# Patient Record
Sex: MALE | Race: WHITE | NOT HISPANIC OR LATINO | URBAN - NONMETROPOLITAN AREA
[De-identification: names, ages, dates, MRNs, and addresses within clinical notes are randomized per-mention and may not be internally consistent; named-entity substitution may affect disease eponyms.]

---

## 2017-01-16 ENCOUNTER — HISTORY (OUTPATIENT)
Dept: PEDIATRICS | Facility: OTHER | Age: 82
End: 2017-01-16

## 2017-01-16 ENCOUNTER — OFFICE VISIT - GICH (OUTPATIENT)
Dept: PEDIATRICS | Facility: OTHER | Age: 82
End: 2017-01-16

## 2017-01-16 ENCOUNTER — ANTICOAGULATION - GICH (OUTPATIENT)
Dept: INTERNAL MEDICINE | Facility: OTHER | Age: 82
End: 2017-01-16

## 2017-01-16 DIAGNOSIS — Z79.01 LONG TERM CURRENT USE OF ANTICOAGULANT: ICD-10-CM

## 2017-01-16 DIAGNOSIS — I48.92 ATRIAL FLUTTER (H): ICD-10-CM

## 2017-01-16 DIAGNOSIS — K57.30 DIVERTICULOSIS OF LARGE INTESTINE WITHOUT PERFORATION OR ABSCESS WITHOUT BLEEDING: ICD-10-CM

## 2017-01-16 DIAGNOSIS — K59.00 CONSTIPATION: ICD-10-CM

## 2017-01-16 DIAGNOSIS — G70.00 MYASTHENIA GRAVIS WITHOUT (ACUTE) EXACERBATION (H): ICD-10-CM

## 2017-01-16 LAB — INR - HISTORICAL: 4.2

## 2017-01-23 ENCOUNTER — ANTICOAGULATION - GICH (OUTPATIENT)
Dept: INTERNAL MEDICINE | Facility: OTHER | Age: 82
End: 2017-01-23

## 2017-01-23 DIAGNOSIS — I48.92 ATRIAL FLUTTER (H): ICD-10-CM

## 2017-01-23 DIAGNOSIS — Z79.01 LONG TERM CURRENT USE OF ANTICOAGULANT: ICD-10-CM

## 2017-01-23 LAB — INR - HISTORICAL: 3.4

## 2017-01-26 ENCOUNTER — AMBULATORY - GICH (OUTPATIENT)
Dept: SCHEDULING | Facility: OTHER | Age: 82
End: 2017-01-26

## 2017-01-27 ENCOUNTER — OFFICE VISIT - GICH (OUTPATIENT)
Dept: PEDIATRICS | Facility: OTHER | Age: 82
End: 2017-01-27

## 2017-01-27 ENCOUNTER — HISTORY (OUTPATIENT)
Dept: PEDIATRICS | Facility: OTHER | Age: 82
End: 2017-01-27

## 2017-01-27 DIAGNOSIS — R68.83 CHILLS (WITHOUT FEVER): ICD-10-CM

## 2017-01-27 DIAGNOSIS — R25.1 TREMOR: ICD-10-CM

## 2017-01-27 DIAGNOSIS — G70.00 MYASTHENIA GRAVIS WITHOUT (ACUTE) EXACERBATION (H): ICD-10-CM

## 2017-02-01 ENCOUNTER — HOSPITAL ENCOUNTER (OUTPATIENT)
Dept: PHYSICAL THERAPY | Facility: OTHER | Age: 82
Setting detail: THERAPIES SERIES
End: 2017-02-01
Attending: INTERNAL MEDICINE

## 2017-02-01 DIAGNOSIS — G70.00 MYASTHENIA GRAVIS WITHOUT (ACUTE) EXACERBATION (H): ICD-10-CM

## 2017-02-02 ENCOUNTER — COMMUNICATION - GICH (OUTPATIENT)
Dept: PEDIATRICS | Facility: OTHER | Age: 82
End: 2017-02-02

## 2017-02-06 ENCOUNTER — ANTICOAGULATION - GICH (OUTPATIENT)
Dept: INTERNAL MEDICINE | Facility: OTHER | Age: 82
End: 2017-02-06

## 2017-02-06 DIAGNOSIS — Z79.01 LONG TERM CURRENT USE OF ANTICOAGULANT: ICD-10-CM

## 2017-02-06 DIAGNOSIS — I48.92 ATRIAL FLUTTER (H): ICD-10-CM

## 2017-02-06 LAB — INR - HISTORICAL: 1.4

## 2017-03-01 ENCOUNTER — HISTORY (OUTPATIENT)
Dept: PEDIATRICS | Facility: OTHER | Age: 82
End: 2017-03-01

## 2017-03-01 ENCOUNTER — COMMUNICATION - GICH (OUTPATIENT)
Dept: INTERNAL MEDICINE | Facility: OTHER | Age: 82
End: 2017-03-01

## 2017-03-01 ENCOUNTER — OFFICE VISIT - GICH (OUTPATIENT)
Dept: PEDIATRICS | Facility: OTHER | Age: 82
End: 2017-03-01

## 2017-03-01 DIAGNOSIS — R25.3 FASCICULATION: ICD-10-CM

## 2017-03-01 DIAGNOSIS — G70.00 MYASTHENIA GRAVIS WITHOUT (ACUTE) EXACERBATION (H): ICD-10-CM

## 2017-03-06 ENCOUNTER — ANTICOAGULATION - GICH (OUTPATIENT)
Dept: INTERNAL MEDICINE | Facility: OTHER | Age: 82
End: 2017-03-06

## 2017-03-06 DIAGNOSIS — I48.92 ATRIAL FLUTTER (H): ICD-10-CM

## 2017-03-06 DIAGNOSIS — Z79.01 LONG TERM CURRENT USE OF ANTICOAGULANT: ICD-10-CM

## 2017-03-06 LAB — INR - HISTORICAL: 1.5

## 2017-03-20 ENCOUNTER — ANTICOAGULATION - GICH (OUTPATIENT)
Dept: INTERNAL MEDICINE | Facility: OTHER | Age: 82
End: 2017-03-20

## 2017-03-20 DIAGNOSIS — Z79.01 LONG TERM CURRENT USE OF ANTICOAGULANT: ICD-10-CM

## 2017-03-20 DIAGNOSIS — I48.92 ATRIAL FLUTTER (H): ICD-10-CM

## 2017-03-20 LAB — INR - HISTORICAL: 1.8

## 2017-04-03 ENCOUNTER — ANTICOAGULATION - GICH (OUTPATIENT)
Dept: INTERNAL MEDICINE | Facility: OTHER | Age: 82
End: 2017-04-03

## 2017-04-03 DIAGNOSIS — Z79.01 LONG TERM CURRENT USE OF ANTICOAGULANT: ICD-10-CM

## 2017-04-03 DIAGNOSIS — I48.92 ATRIAL FLUTTER (H): ICD-10-CM

## 2017-04-03 LAB — INR - HISTORICAL: 1.5

## 2017-04-17 ENCOUNTER — ANTICOAGULATION - GICH (OUTPATIENT)
Dept: INTERNAL MEDICINE | Facility: OTHER | Age: 82
End: 2017-04-17

## 2017-04-17 DIAGNOSIS — I48.92 ATRIAL FLUTTER (H): ICD-10-CM

## 2017-04-17 DIAGNOSIS — Z79.01 LONG TERM CURRENT USE OF ANTICOAGULANT: ICD-10-CM

## 2017-04-17 LAB — INR - HISTORICAL: 2.9

## 2017-04-20 ENCOUNTER — OFFICE VISIT - GICH (OUTPATIENT)
Dept: PEDIATRICS | Facility: OTHER | Age: 82
End: 2017-04-20

## 2017-04-20 ENCOUNTER — HISTORY (OUTPATIENT)
Dept: PEDIATRICS | Facility: OTHER | Age: 82
End: 2017-04-20

## 2017-04-20 DIAGNOSIS — G70.00 MYASTHENIA GRAVIS WITHOUT (ACUTE) EXACERBATION (H): ICD-10-CM

## 2017-05-08 ENCOUNTER — ANTICOAGULATION - GICH (OUTPATIENT)
Dept: INTERNAL MEDICINE | Facility: OTHER | Age: 82
End: 2017-05-08

## 2017-05-08 DIAGNOSIS — Z79.01 LONG TERM CURRENT USE OF ANTICOAGULANT: ICD-10-CM

## 2017-05-08 DIAGNOSIS — I48.92 ATRIAL FLUTTER (H): ICD-10-CM

## 2017-05-08 LAB — INR - HISTORICAL: 4.6

## 2017-05-15 ENCOUNTER — ANTICOAGULATION - GICH (OUTPATIENT)
Dept: INTERNAL MEDICINE | Facility: OTHER | Age: 82
End: 2017-05-15

## 2017-05-15 DIAGNOSIS — Z79.01 LONG TERM CURRENT USE OF ANTICOAGULANT: ICD-10-CM

## 2017-05-15 DIAGNOSIS — I48.92 ATRIAL FLUTTER (H): ICD-10-CM

## 2017-05-15 LAB — INR - HISTORICAL: 3.7

## 2017-05-25 ENCOUNTER — ANTICOAGULATION - GICH (OUTPATIENT)
Dept: INTERNAL MEDICINE | Facility: OTHER | Age: 82
End: 2017-05-25

## 2017-05-25 DIAGNOSIS — Z79.01 LONG TERM CURRENT USE OF ANTICOAGULANT: ICD-10-CM

## 2017-05-25 DIAGNOSIS — I48.92 ATRIAL FLUTTER (H): ICD-10-CM

## 2017-05-25 LAB — INR - HISTORICAL: 2.6

## 2017-06-15 ENCOUNTER — ANTICOAGULATION - GICH (OUTPATIENT)
Dept: INTERNAL MEDICINE | Facility: OTHER | Age: 82
End: 2017-06-15

## 2017-06-15 DIAGNOSIS — I48.92 ATRIAL FLUTTER (H): ICD-10-CM

## 2017-06-15 DIAGNOSIS — Z79.01 LONG TERM CURRENT USE OF ANTICOAGULANT: ICD-10-CM

## 2017-06-15 LAB — INR - HISTORICAL: 2.2

## 2017-07-20 ENCOUNTER — ANTICOAGULATION - GICH (OUTPATIENT)
Dept: INTERNAL MEDICINE | Facility: OTHER | Age: 82
End: 2017-07-20

## 2017-07-20 DIAGNOSIS — I48.92 ATRIAL FLUTTER (H): ICD-10-CM

## 2017-07-20 DIAGNOSIS — Z79.01 LONG TERM CURRENT USE OF ANTICOAGULANT: ICD-10-CM

## 2017-07-20 LAB — INR - HISTORICAL: 1.8

## 2017-07-24 ENCOUNTER — COMMUNICATION - GICH (OUTPATIENT)
Dept: PEDIATRICS | Facility: OTHER | Age: 82
End: 2017-07-24

## 2017-07-24 DIAGNOSIS — I48.92 ATRIAL FLUTTER (H): ICD-10-CM

## 2017-07-24 DIAGNOSIS — Z79.01 LONG TERM CURRENT USE OF ANTICOAGULANT: ICD-10-CM

## 2017-07-26 ENCOUNTER — OFFICE VISIT - GICH (OUTPATIENT)
Dept: PEDIATRICS | Facility: OTHER | Age: 82
End: 2017-07-26

## 2017-07-26 DIAGNOSIS — Z95.0 PRESENCE OF CARDIAC PACEMAKER: ICD-10-CM

## 2017-07-26 DIAGNOSIS — G70.00 MYASTHENIA GRAVIS WITHOUT (ACUTE) EXACERBATION (H): ICD-10-CM

## 2017-07-26 DIAGNOSIS — I48.20 CHRONIC ATRIAL FIBRILLATION (H): ICD-10-CM

## 2017-08-03 ENCOUNTER — ANTICOAGULATION - GICH (OUTPATIENT)
Dept: INTERNAL MEDICINE | Facility: OTHER | Age: 82
End: 2017-08-03

## 2017-08-03 DIAGNOSIS — I48.92 ATRIAL FLUTTER (H): ICD-10-CM

## 2017-08-03 DIAGNOSIS — Z79.01 LONG TERM CURRENT USE OF ANTICOAGULANT: ICD-10-CM

## 2017-08-03 LAB — INR - HISTORICAL: 2.5

## 2017-08-04 ENCOUNTER — OFFICE VISIT - GICH (OUTPATIENT)
Dept: FAMILY MEDICINE | Facility: OTHER | Age: 82
End: 2017-08-04

## 2017-08-04 ENCOUNTER — HOSPITAL ENCOUNTER (OUTPATIENT)
Dept: RADIOLOGY | Facility: OTHER | Age: 82
End: 2017-08-04
Attending: FAMILY MEDICINE

## 2017-08-04 ENCOUNTER — COMMUNICATION - GICH (OUTPATIENT)
Dept: INTERNAL MEDICINE | Facility: OTHER | Age: 82
End: 2017-08-04

## 2017-08-04 ENCOUNTER — HISTORY (OUTPATIENT)
Dept: FAMILY MEDICINE | Facility: OTHER | Age: 82
End: 2017-08-04

## 2017-08-04 DIAGNOSIS — R50.9 FEVER: ICD-10-CM

## 2017-08-04 DIAGNOSIS — R31.9 HEMATURIA: ICD-10-CM

## 2017-08-04 DIAGNOSIS — Z79.01 LONG TERM CURRENT USE OF ANTICOAGULANT: ICD-10-CM

## 2017-08-04 DIAGNOSIS — K57.92 DIVERTICULITIS OF INTESTINE WITHOUT PERFORATION OR ABSCESS WITHOUT BLEEDING: ICD-10-CM

## 2017-08-04 LAB
A/G RATIO - HISTORICAL: 1.2 (ref 1–2)
ABSOLUTE BASOPHILS - HISTORICAL: 0 THOU/CU MM
ABSOLUTE EOSINOPHILS - HISTORICAL: 0 THOU/CU MM
ABSOLUTE IMMATURE GRANULOCYTES(METAS,MYELOS,PROS) - HISTORICAL: 0 THOU/CU MM
ABSOLUTE LYMPHOCYTES - HISTORICAL: 2.1 THOU/CU MM (ref 0.9–2.9)
ABSOLUTE MONOCYTES - HISTORICAL: 1 THOU/CU MM
ABSOLUTE NEUTROPHILS - HISTORICAL: 6.5 THOU/CU MM (ref 1.7–7)
ALBUMIN SERPL-MCNC: 4.3 G/DL (ref 3.5–5.7)
ALP SERPL-CCNC: 66 IU/L (ref 34–104)
ALT (SGPT) - HISTORICAL: 10 IU/L (ref 7–52)
ANION GAP - HISTORICAL: 9 (ref 5–18)
AST SERPL-CCNC: 22 IU/L (ref 13–39)
BACTERIA URINE: ABNORMAL BACTERIA/HPF
BASOPHILS # BLD AUTO: 0.1 %
BILIRUB SERPL-MCNC: 1.5 MG/DL (ref 0.3–1)
BILIRUB UR QL: NEGATIVE
BUN SERPL-MCNC: 21 MG/DL (ref 7–25)
BUN/CREAT RATIO - HISTORICAL: 22
CALCIUM SERPL-MCNC: 9.7 MG/DL (ref 8.6–10.3)
CHLORIDE SERPLBLD-SCNC: 102 MMOL/L (ref 98–107)
CLARITY, URINE: CLEAR CLARITY
CO2 SERPL-SCNC: 25 MMOL/L (ref 21–31)
COLOR UR: YELLOW COLOR
CREAT SERPL-MCNC: 0.97 MG/DL (ref 0.7–1.3)
EOSINOPHIL NFR BLD AUTO: 0.3 %
EPITHELIAL CELLS: ABNORMAL EPI/HPF
ERYTHROCYTE [DISTWIDTH] IN BLOOD BY AUTOMATED COUNT: 14.6 % (ref 11.5–15.5)
GFR IF NOT AFRICAN AMERICAN - HISTORICAL: >60 ML/MIN/1.73M2
GLOBULIN - HISTORICAL: 3.6 G/DL (ref 2–3.7)
GLUCOSE SERPL-MCNC: 105 MG/DL (ref 70–105)
GLUCOSE URINE: NEGATIVE MG/DL
HCT VFR BLD AUTO: 38 % (ref 37–53)
HEMOGLOBIN: 12 G/DL (ref 13.5–17.5)
IMMATURE GRANULOCYTES(METAS,MYELOS,PROS) - HISTORICAL: 0.2 %
INR - HISTORICAL: 2.5
KETONES UR QL: NEGATIVE MG/DL
LACTATE SERPL-MCNC: 2.2 MMOL/L (ref 0.5–2.2)
LEUKOCYTE ESTERASE URINE: NEGATIVE
LYMPHOCYTES NFR BLD AUTO: 21.7 % (ref 20–44)
MCH RBC QN AUTO: 28 PG (ref 26–34)
MCHC RBC AUTO-ENTMCNC: 31.6 G/DL (ref 32–36)
MCV RBC AUTO: 89 FL (ref 80–100)
MONOCYTES NFR BLD AUTO: 10 %
NEUTROPHILS NFR BLD AUTO: 67.7 % (ref 42–72)
NITRITE UR QL STRIP: NEGATIVE
OCCULT BLOOD,URINE - HISTORICAL: ABNORMAL
OTHER: ABNORMAL
PH UR: 7.5 [PH]
PLATELET # BLD AUTO: 177 THOU/CU MM (ref 140–440)
PMV BLD: 11.8 FL (ref 6.5–11)
POTASSIUM SERPL-SCNC: 4.8 MMOL/L (ref 3.5–5.1)
PROT SERPL-MCNC: 7.9 G/DL (ref 6.4–8.9)
PROTEIN QUALITATIVE,URINE - HISTORICAL: 100 MG/DL
PROTIME - HISTORICAL: 27.5 SEC (ref 11.9–15.2)
RBC - HISTORICAL: ABNORMAL /HPF
RED BLOOD COUNT - HISTORICAL: 4.29 MIL/CU MM (ref 4.3–5.9)
SODIUM SERPL-SCNC: 136 MMOL/L (ref 133–143)
SP GR UR STRIP: 1.01
UROBILINOGEN,QUALITATIVE - HISTORICAL: NORMAL EU/DL
WBC - HISTORICAL: ABNORMAL /HPF
WHITE BLOOD COUNT - HISTORICAL: 9.5 THOU/CU MM (ref 4.5–11)

## 2017-08-07 ENCOUNTER — COMMUNICATION - GICH (OUTPATIENT)
Dept: INTERNAL MEDICINE | Facility: OTHER | Age: 82
End: 2017-08-07

## 2017-08-08 ENCOUNTER — OFFICE VISIT - GICH (OUTPATIENT)
Dept: PEDIATRICS | Facility: OTHER | Age: 82
End: 2017-08-08

## 2017-08-08 ENCOUNTER — ANTICOAGULATION - GICH (OUTPATIENT)
Dept: INTERNAL MEDICINE | Facility: OTHER | Age: 82
End: 2017-08-08

## 2017-08-08 ENCOUNTER — HISTORY (OUTPATIENT)
Dept: PEDIATRICS | Facility: OTHER | Age: 82
End: 2017-08-08

## 2017-08-08 DIAGNOSIS — Z79.01 LONG TERM CURRENT USE OF ANTICOAGULANT: ICD-10-CM

## 2017-08-08 DIAGNOSIS — R31.29 OTHER MICROSCOPIC HEMATURIA: ICD-10-CM

## 2017-08-08 DIAGNOSIS — K59.09 OTHER CONSTIPATION: ICD-10-CM

## 2017-08-08 DIAGNOSIS — R09.82 POSTNASAL DRIP: ICD-10-CM

## 2017-08-08 DIAGNOSIS — I48.92 ATRIAL FLUTTER (H): ICD-10-CM

## 2017-08-08 DIAGNOSIS — K57.32 DIVERTICULITIS OF LARGE INTESTINE WITHOUT PERFORATION OR ABSCESS WITHOUT BLEEDING: ICD-10-CM

## 2017-08-08 DIAGNOSIS — G70.00 MYASTHENIA GRAVIS WITHOUT (ACUTE) EXACERBATION (H): ICD-10-CM

## 2017-08-08 DIAGNOSIS — K57.30 DIVERTICULOSIS OF LARGE INTESTINE WITHOUT PERFORATION OR ABSCESS WITHOUT BLEEDING: ICD-10-CM

## 2017-08-08 LAB — INR - HISTORICAL: 2.5

## 2017-08-22 ENCOUNTER — AMBULATORY - GICH (OUTPATIENT)
Dept: LAB | Facility: OTHER | Age: 82
End: 2017-08-22

## 2017-08-22 DIAGNOSIS — R31.29 OTHER MICROSCOPIC HEMATURIA: ICD-10-CM

## 2017-08-22 LAB
BACTERIA URINE: ABNORMAL BACTERIA/HPF
BILIRUB UR QL: NEGATIVE
CLARITY, URINE: CLEAR CLARITY
COLOR UR: YELLOW COLOR
EPITHELIAL CELLS: ABNORMAL EPI/HPF
GLUCOSE URINE: NEGATIVE MG/DL
HYALINE CASTS: ABNORMAL /LPF
KETONES UR QL: ABNORMAL MG/DL
LEUKOCYTE ESTERASE URINE: NEGATIVE
NITRITE UR QL STRIP: NEGATIVE
OCCULT BLOOD,URINE - HISTORICAL: ABNORMAL
OTHER: ABNORMAL
PH UR: 5.5 [PH]
PROTEIN QUALITATIVE,URINE - HISTORICAL: ABNORMAL MG/DL
RBC - HISTORICAL: ABNORMAL /HPF
SP GR UR STRIP: >=1.03
UROBILINOGEN,QUALITATIVE - HISTORICAL: NORMAL EU/DL
WBC - HISTORICAL: ABNORMAL /HPF

## 2017-08-23 ENCOUNTER — COMMUNICATION - GICH (OUTPATIENT)
Dept: INTERNAL MEDICINE | Facility: OTHER | Age: 82
End: 2017-08-23

## 2017-08-23 DIAGNOSIS — R31.29 OTHER MICROSCOPIC HEMATURIA: ICD-10-CM

## 2017-08-30 ENCOUNTER — OFFICE VISIT - GICH (OUTPATIENT)
Dept: UROLOGY | Facility: OTHER | Age: 82
End: 2017-08-30

## 2017-08-30 ENCOUNTER — HISTORY (OUTPATIENT)
Dept: UROLOGY | Facility: OTHER | Age: 82
End: 2017-08-30

## 2017-08-30 DIAGNOSIS — R31.29 OTHER MICROSCOPIC HEMATURIA: ICD-10-CM

## 2017-08-31 ENCOUNTER — ANTICOAGULATION - GICH (OUTPATIENT)
Dept: INTERNAL MEDICINE | Facility: OTHER | Age: 82
End: 2017-08-31

## 2017-08-31 DIAGNOSIS — I48.92 ATRIAL FLUTTER (H): ICD-10-CM

## 2017-08-31 DIAGNOSIS — Z79.01 LONG TERM CURRENT USE OF ANTICOAGULANT: ICD-10-CM

## 2017-08-31 LAB — INR - HISTORICAL: 2.1

## 2017-09-05 ENCOUNTER — COMMUNICATION - GICH (OUTPATIENT)
Dept: INTERNAL MEDICINE | Facility: OTHER | Age: 82
End: 2017-09-05

## 2017-09-05 DIAGNOSIS — I48.92 ATRIAL FLUTTER (H): ICD-10-CM

## 2017-09-22 ENCOUNTER — COMMUNICATION - GICH (OUTPATIENT)
Dept: UROLOGY | Facility: OTHER | Age: 82
End: 2017-09-22

## 2017-09-22 DIAGNOSIS — R31.29 OTHER MICROSCOPIC HEMATURIA: ICD-10-CM

## 2017-09-28 ENCOUNTER — AMBULATORY - GICH (OUTPATIENT)
Dept: SCHEDULING | Facility: OTHER | Age: 82
End: 2017-09-28

## 2017-10-09 ENCOUNTER — ANTICOAGULATION - GICH (OUTPATIENT)
Dept: INTERNAL MEDICINE | Facility: OTHER | Age: 82
End: 2017-10-09

## 2017-10-09 ENCOUNTER — AMBULATORY - GICH (OUTPATIENT)
Dept: LAB | Facility: OTHER | Age: 82
End: 2017-10-09

## 2017-10-09 ENCOUNTER — AMBULATORY - GICH (OUTPATIENT)
Dept: FAMILY MEDICINE | Facility: OTHER | Age: 82
End: 2017-10-09

## 2017-10-09 ENCOUNTER — AMBULATORY - GICH (OUTPATIENT)
Dept: UROLOGY | Facility: OTHER | Age: 82
End: 2017-10-09

## 2017-10-09 ENCOUNTER — HISTORY (OUTPATIENT)
Dept: UROLOGY | Facility: OTHER | Age: 82
End: 2017-10-09

## 2017-10-09 DIAGNOSIS — Z23 ENCOUNTER FOR IMMUNIZATION: ICD-10-CM

## 2017-10-09 DIAGNOSIS — I48.92 ATRIAL FLUTTER (H): ICD-10-CM

## 2017-10-09 DIAGNOSIS — R31.29 OTHER MICROSCOPIC HEMATURIA: ICD-10-CM

## 2017-10-09 DIAGNOSIS — Z79.01 LONG TERM CURRENT USE OF ANTICOAGULANT: ICD-10-CM

## 2017-10-09 DIAGNOSIS — R31.9 HEMATURIA: ICD-10-CM

## 2017-10-09 LAB
BACTERIA URINE: NORMAL BACTERIA/HPF
BILIRUB UR QL: NEGATIVE
CLARITY, URINE: CLEAR CLARITY
COLOR UR: YELLOW COLOR
EPITHELIAL CELLS: NORMAL EPI/HPF
GLUCOSE URINE: NEGATIVE MG/DL
INR - HISTORICAL: 1.8
KETONES UR QL: NEGATIVE MG/DL
LEUKOCYTE ESTERASE URINE: NEGATIVE
NITRITE UR QL STRIP: NEGATIVE
OCCULT BLOOD,URINE - HISTORICAL: ABNORMAL
OTHER: NORMAL
PH UR: 5.5 [PH]
PROTEIN QUALITATIVE,URINE - HISTORICAL: NEGATIVE MG/DL
RBC - HISTORICAL: NORMAL /HPF
SP GR UR STRIP: >=1.03
UROBILINOGEN,QUALITATIVE - HISTORICAL: NORMAL EU/DL
WBC - HISTORICAL: NORMAL /HPF

## 2017-10-23 ENCOUNTER — ANTICOAGULATION - GICH (OUTPATIENT)
Dept: INTERNAL MEDICINE | Facility: OTHER | Age: 82
End: 2017-10-23

## 2017-10-23 ENCOUNTER — COMMUNICATION - GICH (OUTPATIENT)
Dept: INTERNAL MEDICINE | Facility: OTHER | Age: 82
End: 2017-10-23

## 2017-10-23 DIAGNOSIS — Z79.01 LONG TERM CURRENT USE OF ANTICOAGULANT: ICD-10-CM

## 2017-10-23 DIAGNOSIS — I48.92 ATRIAL FLUTTER (H): ICD-10-CM

## 2017-10-23 LAB — INR - HISTORICAL: 1.8

## 2017-11-06 ENCOUNTER — ANTICOAGULATION - GICH (OUTPATIENT)
Dept: INTERNAL MEDICINE | Facility: OTHER | Age: 82
End: 2017-11-06

## 2017-11-06 DIAGNOSIS — I48.92 ATRIAL FLUTTER (H): ICD-10-CM

## 2017-11-06 DIAGNOSIS — Z79.01 LONG TERM CURRENT USE OF ANTICOAGULANT: ICD-10-CM

## 2017-11-06 LAB — INR - HISTORICAL: 2.2

## 2017-11-10 ENCOUNTER — COMMUNICATION - GICH (OUTPATIENT)
Dept: INTERNAL MEDICINE | Facility: OTHER | Age: 82
End: 2017-11-10

## 2017-11-14 ENCOUNTER — OFFICE VISIT - GICH (OUTPATIENT)
Dept: PEDIATRICS | Facility: OTHER | Age: 82
End: 2017-11-14

## 2017-11-14 ENCOUNTER — HISTORY (OUTPATIENT)
Dept: PEDIATRICS | Facility: OTHER | Age: 82
End: 2017-11-14

## 2017-11-14 DIAGNOSIS — R50.9 FEVER: ICD-10-CM

## 2017-11-14 DIAGNOSIS — G70.00 MYASTHENIA GRAVIS WITHOUT (ACUTE) EXACERBATION (H): ICD-10-CM

## 2017-12-04 ENCOUNTER — ANTICOAGULATION - GICH (OUTPATIENT)
Dept: INTERNAL MEDICINE | Facility: OTHER | Age: 82
End: 2017-12-04

## 2017-12-04 DIAGNOSIS — Z79.01 LONG TERM CURRENT USE OF ANTICOAGULANT: ICD-10-CM

## 2017-12-04 DIAGNOSIS — I48.92 ATRIAL FLUTTER (H): ICD-10-CM

## 2017-12-04 LAB — INR - HISTORICAL: 3.3

## 2017-12-18 ENCOUNTER — ANTICOAGULATION - GICH (OUTPATIENT)
Dept: INTERNAL MEDICINE | Facility: OTHER | Age: 82
End: 2017-12-18

## 2017-12-18 DIAGNOSIS — Z79.01 LONG TERM CURRENT USE OF ANTICOAGULANT: ICD-10-CM

## 2017-12-18 DIAGNOSIS — I48.92 ATRIAL FLUTTER (H): ICD-10-CM

## 2017-12-18 LAB — INR - HISTORICAL: 1.9

## 2017-12-27 NOTE — PROGRESS NOTES
Patient Information     Patient Name MRN Ambrose Chavez [id number] Male 8/10/1927      Progress Notes by Simin Bravo MD at 2017  4:56 PM     Author:  Simin Bravo MD Service:  (none) Author Type:  Physician     Filed:  2017  4:56 PM Date of Service:  2017  4:56 PM Status:  Signed     :  Simin Bravo MD (Physician)            Results given during clinic visit.  Simin Bravo MD ....................  2017   4:56 PM

## 2017-12-27 NOTE — PROGRESS NOTES
Patient Information     Patient Name MRN Ambrose Chavez [id number] Male 8/10/1927      Progress Notes by Derick Brock MD at 2017  1:30 PM     Author:  Derick Brock MD Service:  (none) Author Type:  Physician     Filed:  2017  3:09 PM Encounter Date:  2017 Status:  Signed     :  Derick Brock MD (Physician)            Subjective  Ambrose Ni is a 89 y.o. male who presents for multiple concerns. He has a history of myasthenia gravis which has been well controlled on Mestinon. He's taking extended release 180 mg daily at bedtime, 60 mg twice a day. Occasionally he gets a little double vision and lid leg. He takes the 60 mg tablets at 7 AM and 1 PM. He's able to do basic activities of daily living, household chores. Last Wednesday he had a fever with MAXIMUM TEMPERATURE 101.7. It was associated with some left lower quadrant and suprapubic pain. He was concerned it might be a recurrence of diverticulitis. He had increasing urinary urgency and had exacerbation of myasthenia gravis including more difficult time moving and voice was weak. His appetite was reduced. After couple of days his symptoms resolved without any intervention. He had no dysuria. No cough. No dyspnea. No rash. No vomiting. He continues on his fiber supplement.    Problem List/PMH: reviewed in EMR, and made relevant updates today.  Medications: reviewed in EMR, and made relevant updates today.  Allergies: reviewed in EMR, and made relevant updates today.    Social Hx:  Social History        Substance Use Topics          Smoking status:   Former Smoker      Packs/day:  1.25      Years:  25.00      Types:  Cigarettes      Quit date:  1963      Smokeless tobacco:   Never Used      Alcohol use   Yes      Comment: social per pt       Social History Narrative    Retired. , lives with his wife. Children live in [facility]    Cholesterol 220 to 240 with an LDL of 150.     Has specialty care in  "[place]              I reviewed social history and made relevant updates today.    Family Hx:   Family History       Problem   Relation Age of Onset     Other  Father       at 79 in his sleep       Heart Disease  Mother      MI       Hypertension  Mother      Stroke  Mother      CVA at 76       Other  Brother      CNS hemorrhage at 59       Cancer  Brother      CA of the lung       Other  Sister      Respiratory failure with pulmonary fibrosis       Other  Sister       at age 81 with dementia       Good Health  Son      Good Health  Son      Sarcoidosis  Grandchild      Thyroid Disease  No Family History      Lupus  No Family History      Rheum arthritis  No Family History        Objective  Vitals: reviewed in EMR.  /82  Pulse 64  Temp 97.3  F (36.3  C)  Ht 1.702 m (5' 7\")  Wt 78.2 kg (172 lb 6.4 oz)  BMI 27 kg/m2    Gen: Pleasant male, NAD.  HEENT: MMM  Neck: Supple  Pulm: Breathing easily  Neuro: Grossly intact  Skin: No concerning lesions.  Psychiatric: Normal affect and insight. Does not appear anxious or depressed.  Abdomen: Soft, nontender, nondistended. No rebound or guarding.    Diabetes Labs  Lab Results      Component  Value Date/Time    CHOL 146 2016 08:50 AM    HDL 37 2016 08:50 AM    LDLCHOL 81 2016 08:50 AM    TRIGLYCERIDE 139 2016 08:50 AM    CREATININE 0.97 2017 03:20 PM           Assessment    ICD-10-CM    1. Myasthenia gravis (HC) G70.00    2. FUO (fever of unknown origin) R50.9        From the history his myasthenia gravis appears to be well controlled. We discussed the possibility of adjusting his short acting Mestinon. In order to facilitate this I'd like to see a diary of symptoms. We've again discussed the possibility for neurology consultation and at this time may are considering a trip to [place] but would like to wait until after the winter. With regards to his fever the etiology is unclear. Unlikely to have been bacterial since it resolved " without intervention. Source is unknown. If fever does recur I do recommend repeat evaluation.    Plan   -- Expected clinical course discussed   -- Medications and their side effects discussed  Patient Instructions    -- No change to meds   -- Keep a symptom diary   -- Follow-up with diary to discuss adjusting meds         Index   Myasthenia Gravis   ________________________________________________________________________  KEY POINTS    Myasthenia gravis is an autoimmune disease that affects your nerves and muscles.    There is no cure for myasthenia gravis, but treatment can help you manage your symptoms.    Take your medicine on an exact schedule. Any delay in taking medicine may leave you unable to swallow or breathe.  ________________________________________________________________________  What is myasthenia gravis?  Myasthenia gravis is an autoimmune disease that affects the connections between your nerves and muscles. An autoimmune disease is a disease that causes your body to mistakenly attack your own healthy tissue.  What is the cause?  The immune system is your body s defense against infection. Antibodies are proteins your immune system makes to help fight infections. When you have myasthenia gravis, your immune system makes antibodies that attack the place where your nerves connect with the muscles. In myasthenia gravis, these antibodies block chemicals that help you move your muscles. This makes your muscles very weak.  The cause of this disease is not well understood. It may start at any age. In women, it happens more often before age 40. In men, it is more likely after age 60.  Rarely, this disease is caused by tumors of the thymus gland. The thymus is a small gland behind your breastbone that helps your body fight infection.  What are the symptoms?  The main symptom is muscle weakness that gets worse with activity and better with rest. Muscle weakness may cause other symptoms such as:    Eye  problems, such as double vision or droopy eyelids    Trouble speaking, chewing, and swallowing    Feeling that your head is heavy    Change in facial expression    Trouble doing things that use the muscles of your arms or legs, such as climbing stairs or combing hair    Trouble breathing because of weakness in the chest muscles    Feeling tired all the time  Symptoms can change from day to day. Stress, infection, or other factors can make symptoms worse. You may have times when you don t have symptoms (remissions) and then your symptoms make come back (relapses).  How is it diagnosed?  Your healthcare provider will ask about your symptoms and medical history and examine you.  You may have tests such as:    Blood tests    Muscle strength test, which uses an injection of medicine (Tensilon) to see if it helps your muscle strength    EMG (electromyogram), which uses needles passed through your skin to send mild electric signals and check how your nerves and muscles respond  How is it treated?  There is no cure for myasthenia gravis, but treatment can help you manage your symptoms. Treatments may include:    Medicines to improve muscle strength by helping your muscles get signals from your nerves.    Steroids or other medicine to keep your immune system from making antibodies. Using a steroid for a long time can have serious side effects. Take steroid medicine exactly as your healthcare provider prescribes. Don't take more or less of it than prescribed by your provider and don't take it longer than prescribed. Don't stop taking a steroid without your provider's approval. You may have to lower your dosage slowly before stopping it.    Fast-acting medicines to treat your immune system called IVIG or intravenous immune globulin    Exchanging your plasma, also called plasmapheresis, the fluid part of your blood that contains antibodies, with plasma that does not have these antibodies.    Surgery to take out part or all of  your thymus gland if this is the cause of your myasthenia gravis.    If your muscles get so weak that you have trouble breathing, you may need emergency medical care and a breathing machine to help you breathe until your muscles are stronger.  How can I take care of myself?  Follow the full course of treatment prescribed by your healthcare provider. In addition:    If you are on drug therapy, it is very important to take your medicine on an exact schedule. Any delay in taking medicine may leave you unable to swallow or breathe. You may want to set an alarm clock to remind you to take your medicine.    Tell all healthcare providers who treat you that you have myasthenia gravis. Some medicines may change the way your MG medicines work. Some medicines can cause you to have severe breathing problems. If you have trouble swallowing or breathing, get emergency treatment right away.    You should carry an ID card or wear a medical ID bracelet or necklace that says you have myasthenia gravis. If you need emergency care, surgery, or lab tests, this helps the healthcare provider know how to treat you.    Learn to manage stress. Ask for help at home and work when the load is too great to handle. Find ways to relax. For example take up a hobby, listen to music, watch movies, or take walks. Try yoga, meditation, or deep breathing exercises when you feel stressed.    Get support. Consider a counselor or talk with family and friends. Join a support group in your area.    Ask your provider:    How and when you will get your test results    If there are activities you should avoid    How to take care of yourself at home    What symptoms or problems you should watch for and what to do if you have them  Make sure you know when you should come back for a checkup. Keep all appointments for provider visits or tests.  For more information contact:    Myasthenia Gravis [facility]  [place]  [phone]  http://www.myasthenia.org  Developed by [employer].  Adult Advisor 2016.3 published by [employer].  Last modified: 2016-03-23  Last reviewed: 2015-08-19  This content is reviewed periodically and is subject to change as new health information becomes available. The information is intended to inform and educate and is not a replacement for medical evaluation, advice, diagnosis or treatment by a healthcare professional.  References   Adult Advisor 2016.3 Index    Copyright   2016 [employer], a division of [employer] All rights reserved.         Return in about 3 months (around 2/14/2018) for Medication Management.    SignedDerick MD  Internal Medicine & Pediatrics    Total time 35 minutes, over half spent counseling and coordinating care regarding MG.

## 2017-12-27 NOTE — PROGRESS NOTES
Patient Information     Patient Name MRN Ambrose Chavez [id number] Male 8/10/1927      Progress Notes by Simin Bravo MD at 2017  3:34 PM     Author:  Simin Bravo MD Service:  (none) Author Type:  Physician     Filed:  2017  3:34 PM Encounter Date:  2017 Status:  Signed     :  Simin Bravo MD (Physician)            Please call.    Urine culture is negative.  He should schedule a follow up appointment with Dr Brock for recheck of blood found in his urine.   Simin Bravo MD ....................  2017   3:33 PM

## 2017-12-27 NOTE — PROGRESS NOTES
Patient Information     Patient Name MRN Ambrose Chavez [id number] Male 8/10/1927      Progress Notes by Derick Brock MD at 2017 12:45 PM     Author:  Derick Brock MD Service:  (none) Author Type:  Physician     Filed:  2017 12:34 PM Encounter Date:  2017 Status:  Signed     :  Derick Brock MD (Physician)            Subjective  Ambrose Ni is a 89 y.o. male who presents for follow-up myasthenia gravis. He's been doing very well. His eyes aren't really drooping very much anymore. He continues on Mestinon sustained release at bedtime, and a half tablet of short acting Mestinon in the morning and before his afternoon nap. He seems to notice the most symptoms if he has been very active in the late morning such as going grocery shopping before he takes his afternoon nap. He's wondering if we can increase the dosages of his short acting Mestinon. His energy is improved. He occasionally gets double vision at midday during the time identified above. He's had some tingling off and on which has improved since adjusting the timing of his medications. He is looking forward to his 89th birthday party coming up next week. He had his pacemaker placed in [place]. He has several questions about the timing of pacemaker generator replacement, monitoring of pacemaker, availability of the local resources.    Problem List/PMH: reviewed in EMR, and made relevant updates today.  Medications: reviewed in EMR, and made relevant updates today.  Allergies: reviewed in EMR, and made relevant updates today.    Social Hx:  Social History        Substance Use Topics          Smoking status:   Former Smoker      Packs/day:  1.25      Years:  25.00      Types:  Cigarettes      Quit date:  1963      Smokeless tobacco:   Never Used      Alcohol use   Yes      Comment: social per pt       Social History Narrative    Retired. , lives with his wife. Children live in [facility] and [place]~~     Cholesterol 220 to 240 with an LDL of 150.     Has specialty care in [facility]              I reviewed social history and made relevant updates today.    Family Hx:   Family History       Problem   Relation Age of Onset     Other  Father       at 79 in his sleep       Heart Disease  Mother      MI       Hypertension  Mother      Stroke  Mother      CVA at 76       Other  Brother      CNS hemorrhage at 59       Cancer  Brother      CA of the lung       Other  Sister      Respiratory failure with pulmonary fibrosis       Other  Sister       at age 81 with dementia       Good Health  Son      Good Health  Son      Sarcoidosis  Grandchild      Thyroid Disease  No Family History      Lupus  No Family History      Rheum arthritis  No Family History        Objective  Vitals: reviewed in EMR.  /70  Pulse 60  Temp 97.6  F (36.4  C) (Tympanic)   Wt 78.3 kg (172 lb 9.6 oz)  BMI 27.03 kg/m2    Gen: Pleasant male, NAD.  HEENT: MMM  Neck: Supple  CV: RRR no m/r/g.   Pulm: CTAB no w/r/r  Neuro: Grossly intact  Msk: No lower extremity edema.  Skin: No concerning lesions.  Psychiatric: Normal affect and insight. Does not appear anxious or depressed.    Assessment    ICD-10-CM    1. Myasthenia gravis (HC) G70.00 pyridostigmine (MESTINON) 60 mg tablet      pyridostigmine (MESTINON TIMESPAN) 180 mg Sustained-Release tablet   2. Cardiac pacemaker Z95.0    3. Permanent atrial fibrillation (HC) I48.2        With regards to his myasthenia gravis he appears to be very well controlled at this point in time. We can make a slight adjustment to his short acting Mestinon to see if double vision and fatigue with significant exercise during the day may improve. Unfortunately his tingling may also worsen. If symptoms are unable to be controlled with our current plan could consider neurology consultation. I offered referral to our local cardiologist and pacemaker clinic however the patient declined.    Plan   -- Expected clinical  course discussed   -- Medications and their side effects discussed  Patient Instructions    -- No change to evening mestinon dose   -- Increase day time mestinon up to 60 mg twice daily   -- Low threshold to see Dr. Gramajo ([facility]) if symptoms worsen or other concerns    Return in about 3 months (around 10/26/2017), or if symptoms worsen or fail to improve, for medication management.    Signed, Derick Brock MD  Internal Medicine & Pediatrics

## 2017-12-28 NOTE — TELEPHONE ENCOUNTER
Patient Information     Patient Name MRN Ambrose Chavez [id number] Male 8/10/1927      Telephone Encounter by Imelda Muñiz at 2017 11:25 AM     Author:  Imelda Muñiz Service:  (none) Author Type:  (none)     Filed:  2017 11:26 AM Encounter Date:  2017 Status:  Signed     :  Imelda Muñiz            Letter was read to patient.      He will await a call from the  to get an appointment with Dr. Tavarez.    Imelda Muñiz LPN....................2017 11:26 AM

## 2017-12-28 NOTE — TELEPHONE ENCOUNTER
Patient Information     Patient Name MRN Ambrose Chavez [id number] Male 8/10/1927      Telephone Encounter by Elba Pereira RN at 10/23/2017  1:47 PM     Author:  Elba Pereira RN Service:  (none) Author Type:  NURS- Registered Nurse     Filed:  10/23/2017  1:49 PM Encounter Date:  10/23/2017 Status:  Signed     :  Elba Pereira RN (NURS- Registered Nurse)            Anticoagulant    Office visit in the past 12 months.    Last visit with SAROJ CRAIG was on: 2017 in [facility]  Next visit with SAROJ CRAIG is on: No future appointment listed with this provider  Next visit with Internal Medicine is on: No future appointment listed in this department    Lab tests:  PT/INR at least monthly    INR (no units)    Date Value   10/09/2017 1.8 (H)     HEMOGLOBIN                (g/dL)    Date Value   2017 12.0 (L)         Prescription refilled per RN Medication Refill Policy.................... Elba Pereira RN ....................  10/23/2017   1:48 PM

## 2017-12-28 NOTE — TELEPHONE ENCOUNTER
Patient Information     Patient Name MRN Ambrose Chavez [id number] Male 8/10/1927      Telephone Encounter by Neela Davey at 11/10/2017  9:35 AM     Author:  Neela Davey Service:  (none) Author Type:  (none)     Filed:  11/10/2017  9:36 AM Encounter Date:  11/10/2017 Status:  Signed     :  Neela Davey            Would you like him in a 30 min spot in an approval required spot on Tuesday or Wednesday?  Neela Davey LPN ....................  11/10/2017   9:36 AM

## 2017-12-28 NOTE — PROGRESS NOTES
Patient Information     Patient Name MRAmbrose Soliz [id number] Male 8/10/1927      Progress Notes by Kyle Tavarez MD at 2017 11:00 AM     Author:  Kyle Tavarez MD Service:  (none) Author Type:  Physician     Filed:  2017  1:14 PM Encounter Date:  2017 Status:  Signed     :  Kyle Tavarez MD (Physician)            I was asked to see this patient by Derick Brock MD and provide my opinion about the following:  Hematuria    Type of Visit  Consult    Chief Complaint  Hematuria    HPI  Mr. Ni is a 89 y.o. male who presents with hematuria.  Microhematuria was first noted on UA 1 week ago.  Patient denies associated dysuria at the time of onset.    Hematuria-related signs/symptoms  History of smoking?    Yes- quit many years ago  History of chemotherapy?   No  History of pelvic radiation?   No  History of kidney or bladder stones?  No  History of frequent urinary tract infections? No      Past Medical History  He  has a past medical history of Aortic valve insufficiency; Congestion of nasal sinus; Elevated cholesterol; History of acute renal failure; History of tobacco abuse; RECTAL BLEEDING (2011); and SYNCOPE, HX OF (2011).  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     ATRIAL FLUTTER, CHRONIC I48.92     Cataracts, both eyes H26.9     Pruritus L29.9     Benign prostatic hypertrophy without lower urinary tract symptoms (LUTS)  N40.0     Warfarin anticoagulation Z79.01     Post herpetic neuralgia - right flank B02.29     Cardiac pacemaker Z95.0     Anticoagulation monitoring, INR range 2-3 [V58.61] Z79.01     Myasthenia gravis (HC) G70.00     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    Medications  He has a current medication list which includes the following prescription(s): albuterol, metoprolol succinate, multivitamins-minerals-lutein, nebulizer, polyethylene glycol, pyridostigmine, pyridostigmine, triamcinolone, and warfarin.    Allergies  Allergies      Allergen   Reactions     Mydriacyl [Tropicamide]  Angioedema     Other [Unlisted Allergen (Include Detail In Comments)]  Edema     Neosynephrine      Phenylephrine  Edema     Sulfa (Sulfonamide Antibiotics)  Other - Describe In Comment Field     Abnormal Kidney and liver functioning       Nuts [Tree Nut]  GI Upset     Macadamia nuts        Social History  He  reports that he quit smoking about 54 years ago. His smoking use included Cigarettes. He has a 31.25 pack-year smoking history. He has never used smokeless tobacco. He reports that he drinks alcohol. He reports that he does not use illicit drugs.  No drug abuse.    Family History  Family History       Problem   Relation Age of Onset     Other  Father       at 79 in his sleep       Heart Disease  Mother      MI       Hypertension  Mother      Stroke  Mother      CVA at 76       Other  Brother      CNS hemorrhage at 59       Cancer  Brother      CA of the lung       Other  Sister      Respiratory failure with pulmonary fibrosis       Other  Sister       at age 81 with dementia       Good Health  Son      Good Health  Son      Sarcoidosis  Grandchild      Thyroid Disease  No Family History      Lupus  No Family History      Rheum arthritis  No Family History        Review of Systems  I personally reviewed the ROS with the patient.    Nursing Notes:   Vinnie  Ysabel CARLIN  8/30/2017 11:15 AM  Signed  Patient presents to the clinic for hematuria consult.  Ysabel Birmingham LPN........................8/30/2017  10:53 AM    Review of Systems:    Weight loss:    No     Recent fever/chills:  yes   Night sweats:   No  Current skin rash:  No   Recent hair loss:  No  Heat intolerance:  No   Cold intolerance:  No  Chest pain:   No   Palpitations:   No  Shortness of breath:  No   Wheezing:   No  Constipation:    No   Diarrhea:   No   Nausea:   No   Vomiting:   No   Kidney/side pain:  No   Back pain:   No  Frequent headaches:  No   Dizziness:     No  Leg swelling:   No   Calf pain:    No    Parents, brothers or sisters with history of kidney cancer?   No  Parents, brothers or sisters with history of bladder cancer: No      Physical Exam  Vitals:     08/30/17 1054   BP: 140/70   Pulse: 76   Weight: 78.7 kg (173 lb 9.6 oz)     Constitutional: No acute distress.  Alert and cooperative   Head: NCAT  Eyes: Conjunctivae normal  Cardiovascular: Regular rate.  Pulmonary/Chest: Respirations are even and non-labored bilaterally, no audible wheezing  Abdominal: Soft. No distension, tenderness, masses or guarding.   Neurological: A + O x 3.  Cranial Nerves II-XII grossly intact.  Extremities: ZULMA x 4, Warm. No clubbing.  No cyanosis.    Skin: Pink, warm and dry.  No visible rashes noted.  Psychiatric:  Normal mood and affect  Back:  No left CVA tenderness.  No right CVA tenderness.  Genitourinary:  Nonpalpable bladder    Labs  CREATININE (mg/dL)    Date Value   08/04/2017 0.97     Recent Labs       08/22/17   0930   COLOR  Yellow   CLARITY  Clear   SPECGRAV  >=1.030 A   PHURINE  5.5   UROBILINOGEN  Normal   PROTEINUA  Trace A       Recent Labs       08/22/17   0930   GLUCOSEUA  Negative   KETONESUA  Trace A   BLOODUA  Small A   NITRITE  Negative   LEUKOCYTE  Negative     Lab Results      Component  Value Date/Time    WBCUA None Seen 08/22/2017 09:30 AM    RBCUA 3-5 (A) 08/22/2017 09:30 AM     BACTERIAUA Few 08/22/2017 09:30 AM    EPITHELIALUA Few 08/22/2017 09:30 AM       Imaging  CT a/p   11/15/2016  I personally reviewed and interpreted the images and report.  IMPRESSION:  1.   Sigmoid diverticulosis with mild fat stranding suggesting mild diverticulitis. No distinct evidence of perforation.  2.  Enlarged, heterogeneously dense prostate gland, felt to be related to recent TURP procedure.  3.  5 cm cortical cyst of the left kidney.  4.  No acute change within the chest. The large hiatal hernia, emphysema, pleural-based nodule has well as prominent right hilar lymph node are similar in appearance.    Assessment  Mr. Ni is a 89 y.o. male with microscopic hematuria  Relatively recent CT was negative.    Discussed rationale for work up.  Discussed potential findings of hematuria work up including, but not limited to, kidney stones, bladder tumors and/or kidney tumors.  Also discussed the potential for a normal work up.    Had a long discussion with the patient and his daughter.  He would prefer to defer any additional testing at this time, but rather, follow-up for repeat urinalysis    Plan  Follow up for repeat urinalysis in 6 weeks.  If this is positive for microscopic hematuria - will perform cystoscopy at that visit.

## 2017-12-28 NOTE — TELEPHONE ENCOUNTER
Patient Information     Patient Name MRN Ambrose Chavez [id number] Male 8/10/1927      Telephone Encounter by Mere Seaman at 11/10/2017  9:29 AM     Author:  Mere Seaman Service:  (none) Author Type:  (none)     Filed:  11/10/2017  9:33 AM Encounter Date:  11/10/2017 Status:  Signed     :  Mere Seaman            Patient has an appointment for , but spouse would like him to be seen sooner for symptoms he is having with his Myasthenia Gravis.    Mere Seaman ....................  11/10/2017   9:32 AM

## 2017-12-28 NOTE — TELEPHONE ENCOUNTER
"Patient Information     Patient Name MRN Ambrose Chavez [id number] Male 8/10/1927      Telephone Encounter by Manav Hahn RN at 2017  1:09 PM     Author:  Manav Hahn RN Service:  (none) Author Type:  NURS- Registered Nurse     Filed:  2017  1:40 PM Encounter Date:  2017 Status:  Signed     :  Manav Hahn RN (NURS- Registered Nurse)            Writer returned call to patient as requested. Patient's wife, Adri answered the phone. States that she is actually the one who had called in the beginning. Writer and patient's wife completed triage as noted below as patient was unavailable at this time as was sleeping. Patient with no symptoms as this time with exception of a cough, as well as diagnosis of Myasthenia gravis. Patient did have a fever last night, that has cleared at this time. However, as patient with a history of diverticulitis, patient with increased risk due to age and disease process, writer and triage disposition agree that patient should be seen in the clinic today if possible. Patient's wife agrees. No openings noted with PCP, however patient's wife asks this writer to check and see if PCP will work patient in. Writer did speak to PCP's nurse, who states patient cannot be worked in. Appointment noted with Dr. Bravo at 1445 today. Patient's wife happy with seeing that MD at that time. Will call back as needed in the interim. Writer transferred patient to scheduling with appointment date and time noted and given to scheduling staff. Patient's wife happy with plan of care. Writer will close encounter at this time.    Reason for Disposition    [1] Fever > 101 F (38.3 C) AND [2] age > 60    Answer Assessment - Initial Assessment Questions  1. TEMPERATURE: \"What is the most recent temperature?\"  \"How was it measured?\"       Temp today was 97 something. Was taken before lunch today. Last night had a temp of 101 around supper time. Temp was normal later in the evening. " "Was yawning a lot. Denies chills at this time, but did have chills when fever was up at 101.  2. ONSET: \"When did the fever start?\"       See above  3. SYMPTOMS: \"Do you have any other symptoms besides the fever?\"  (e.g., colds, headache, sore throat, earache, cough, rash, diarrhea, vomiting, abdominal pain)      Denies any other symptoms at this time, just a spike of a fever last night, then fever went down. Has a little bit of a cough  4. CAUSE: If there are no symptoms, ask: \"What do you think is causing the fever?\"       Is concerned that it might be diverticulitis. However patient's wife reports that patient's bowel movements are fine, and that patient has no symptoms  5. CONTACTS: \"Does anyone else in the family have an infection?\"      Wife had felt off, not real good at the beginning of the week. Feels fine now. Wife reports that she had stomach issues, nausea at the beginning of the week.  6. TREATMENT: \"What have you done so far to treat this fever?\" (e.g., medications)      Patient took a tylenol when fever was 101  7. IMMUNOCOMPROMISE: \"Do you have of the following: diabetes, HIV positive, splenectomy, cancer chemotherapy, chronic steroid treatment, transplant patient, etc.\"      Myasthenia gravis??  8. PREGNANCY: \"Is there any chance you are pregnant?\" \"When was your last menstrual period?\"      N/A  9. TRAVEL: \"Have you traveled out of the country in the last month?\" (e.g., travel history, exposures)      No    Protocols used: ADULT FEVER-A-    Manav Hahn RN ....................  8/4/2017   1:40 PM        "

## 2017-12-28 NOTE — PATIENT INSTRUCTIONS
Patient Information     Patient Name MRN Ambrose Chavez [id number] Male 8/10/1927      Patient Instructions by Imelda Ratliff RN at 2017  9:15 AM     Author:  Imelda Ratliff RN Service:  (none) Author Type:  NURS- Registered Nurse     Filed:  2017  9:18 AM Encounter Date:  2017 Status:  Signed     :  Imelda Ratliff RN (NURS- Registered Nurse)            2017 Details    Sun Mon Tue Wed Thu Fri Sat        1               2               3               4                 5               6      4 mg   See details      7      3 mg         8      3 mg         9      3 mg         10      4 mg         11      3 mg           12      3 mg         13      4 mg         14      3 mg         15      3 mg         16      3 mg         17      4 mg         18      3 mg           19      3 mg         20      4 mg         21      3 mg         22      3 mg         23      3 mg         24      4 mg         25      3 mg           26      3 mg         27      4 mg         28      3 mg         29      3 mg         30      3 mg            Date Details    This INR check               How to take your warfarin dose     To take:  3 mg Take one and a half of the 2 mg tablets.    To take:  4 mg Take two of the 2 mg tablets.           2017 Details    Sun Mon Tue Wed Thu Fri Sat          1      4 mg         2      3 mg           3      3 mg         4      4 mg         5               6               7               8               9                 10               11               12               13               14               15               16                 17               18               19               20               21               22               23                 24               25               26               27               28               29               30                 31                      Date Details   No additional details    Date of next INR:   12/4/2017         How to take your warfarin dose     To take:  3 mg Take one and a half of the 2 mg tablets.    To take:  4 mg Take two of the 2 mg tablets.             Description          Continue same Warfarin dose and recheck in 1 month.  LINDY KOO RN   11/6/2017    9:18 AM                      Anticoagulation Summary as of 11/6/2017     INR goal 2.0-3.0    Today's INR 2.2    Next INR check 12/4/2017          Call your Anticoagulation Clinic at Dept: [phone]   if:   1. Any medications are started, stopped, or there is a change in dose.  2. You experience any bleeding that is not easily stopped or if it is recurrent.  3. You notice an increase in bruising or any bruising that does not heal.  4. You are scheduled for surgery, colonoscopy, dental extraction or any other procedure where you may need to stop your Coumadin (warfarin).

## 2017-12-28 NOTE — TELEPHONE ENCOUNTER
Patient Information     Patient Name MRN Ambrose Chavez [id number] Male 8/10/1927      Telephone Encounter by Shelby Tse at 2017  9:29 AM     Author:  Shelby Tse Service:  (none) Author Type:  (none)     Filed:  2017  9:29 AM Encounter Date:  2017 Status:  Signed     :  Shelby Tse            [facility]- Patient has questions on test results . Blood in urine. Patient is going out of town .

## 2017-12-28 NOTE — PROGRESS NOTES
"Patient Information     Patient Name MRN Ambrose Chavez [id number] Male 8/10/1927      Progress Notes by Derick Brock MD at 2017  9:30 AM     Author:  Derick Brock MD Service:  (none) Author Type:  Physician     Filed:  2017 10:07 AM Encounter Date:  2017 Status:  Signed     :  Derick Brock MD (Physician)            Subjective  Ambrose Ni is a 89 y.o. male who presents for follow-up fever. He was seen last week on 2017 after having developed a fever with MAXIMUM TEMPERATURE 101 Fahrenheit associated with chills. He underwent a thorough evaluation with Dr. Bravo. Was prescribed Augmentin. The only physical symptoms he had was some left lower quadrant abdominal discomfort that was discovered on Dr. Bravo's exam and persistent phlegm which is chronic in nature. No cough or dyspnea. No nausea or vomiting. After taking the Augmentin the fever resolved. He thinks the abdominal pain has gone as well. He reduced his dose of Mestinon to 30 mg twice a day but that didn't really change anything so he is going to go back up to 60 twice a day. He's been battling constipation. He takes Metamucil \"if I remember it.\" He notices that if he has to use the restroom to urinate sometimes it's very urgent and he has a small amount of urine leak into his underwear.    Problem List/PMH: reviewed in EMR, and made relevant updates today.  Medications: reviewed in EMR, and made relevant updates today.  Allergies: reviewed in EMR, and made relevant updates today.    Social Hx:  Social History        Substance Use Topics          Smoking status:   Former Smoker      Packs/day:  1.25      Years:  25.00      Types:  Cigarettes      Quit date:  1963      Smokeless tobacco:   Never Used      Alcohol use   Yes      Comment: social per pt       Social History Narrative    Retired. , lives with his wife. Children live in [facility]    Cholesterol 220 to 240 " "with an LDL of 150.     Has specialty care in [facility] reviewed social history and made relevant updates today.    Family Hx:   Family History       Problem   Relation Age of Onset     Other  Father       at 79 in his sleep       Heart Disease  Mother      MI       Hypertension  Mother      Stroke  Mother      CVA at 76       Other  Brother      CNS hemorrhage at 59       Cancer  Brother      CA of the lung       Other  Sister      Respiratory failure with pulmonary fibrosis       Other  Sister       at age 81 with dementia       Good Health  Son      Good Health  Son      Sarcoidosis  Grandchild      Thyroid Disease  No Family History      Lupus  No Family History      Rheum arthritis  No Family History        Objective  Vitals: reviewed in EMR.  /80  Pulse 70  Temp 97.9  F (36.6  C) (Tympanic)   Ht 1.702 m (5' 7\")  Wt 77.9 kg (171 lb 12.8 oz)  BMI 26.91 kg/m2    Gen: Pleasant male, NAD.  HEENT: MMM  Neck: Supple  Pulm: Breathing easily  GI: Soft, NT, ND. No HSM. No masses. Bowel sounds present.  Neuro: Grossly intact  Skin: No concerning lesions.  Psychiatric: Normal affect and insight. Does not appear anxious or depressed.    Results for orders placed or performed in visit on 17      COMPLETE METABOLIC PANEL      Result  Value Ref Range    SODIUM 136 133 - 143 mmol/L    POTASSIUM 4.8 3.5 - 5.1 mmol/L    CHLORIDE 102 98 - 107 mmol/L    CO2,TOTAL 25 21 - 31 mmol/L    ANION GAP 9 5 - 18                    GLUCOSE 105 70 - 105 mg/dL    CALCIUM 9.7 8.6 - 10.3 mg/dL    BUN 21 7 - 25 mg/dL    CREATININE 0.97 0.70 - 1.30 mg/dL    BUN/CREAT RATIO           22                    GFR if African American >60 >60 ml/min/1.73m2    GFR if not African American >60 >60 ml/min/1.73m2    ALBUMIN 4.3 3.5 - 5.7 g/dL    PROTEIN,TOTAL 7.9 6.4 - 8.9 g/dL    GLOBULIN                  3.6 2.0 - 3.7 g/dL    A/G RATIO 1.2 1.0 - 2.0                    BILIRUBIN,TOTAL 1.5 (H) 0.3 - 1.0 mg/dL    ALK " PHOSPHATASE 66 34 - 104 IU/L    ALT (SGPT) 10 7 - 52 IU/L    AST (SGOT) 22 13 - 39 IU/L   LACTATE,BLOOD      Result  Value Ref Range    LACTATE,BLOOD 2.2 0.5 - 2.2 mmol/L   PROTIME-INR      Result  Value Ref Range    INR 2.5 (H) <1.3    PROTIME 27.5 (H) 11.9 - 15.2 sec   CBC WITH AUTO DIFFERENTIAL      Result  Value Ref Range    WHITE BLOOD COUNT         9.5 4.5 - 11.0 thou/cu mm    RED BLOOD COUNT           4.29 (L) 4.30 - 5.90 mil/cu mm    HEMOGLOBIN                12.0 (L) 13.5 - 17.5 g/dL    HEMATOCRIT                38.0 37.0 - 53.0 %    MCV                       89 80 - 100 fL    MCH                       28.0 26.0 - 34.0 pg    MCHC                      31.6 (L) 32.0 - 36.0 g/dL    RDW                       14.6 11.5 - 15.5 %    PLATELET COUNT            177 140 - 440 thou/cu mm    MPV                       11.8 (H) 6.5 - 11.0 fL    NEUTROPHILS               67.7 42.0 - 72.0 %    LYMPHOCYTES               21.7 20.0 - 44.0 %    MONOCYTES                 10.0 <12.0 %    EOSINOPHILS               0.3 <8.0 %    BASOPHILS                 0.1 <3.0 %    IMMATURE GRANULOCYTES(METAS,MYELOS,PROS) 0.2 %    ABSOLUTE NEUTROPHILS      6.5 1.7 - 7.0 thou/cu mm    ABSOLUTE LYMPHOCYTES      2.1 0.9 - 2.9 thou/cu mm    ABSOLUTE MONOCYTES        1.0 (H) <0.9 thou/cu mm    ABSOLUTE EOSINOPHILS      0.0 <0.5 thou/cu mm    ABSOLUTE BASOPHILS        0.0 <0.3 thou/cu mm    ABSOLUTE IMMATURE GRANULOCYTES(METAS,MYELOS,PROS) 0.0 <=0.3 thou/cu mm   URINALYSIS W REFLEX MICROSCOPIC IF POSITIVE      Result  Value Ref Range    COLOR                     Yellow Yellow Color    CLARITY                   Clear Clear Clarity    SPECIFIC GRAVITY,URINE    1.015 1.010, 1.015, 1.020, 1.025                    PH,URINE                  7.5 6.0, 7.0, 8.0, 5.5, 6.5, 7.5, 8.5                    UROBILINOGEN,QUALITATIVE  Normal Normal EU/dl    PROTEIN, URINE 100 (A) Negative mg/dL    GLUCOSE, URINE Negative Negative mg/dL    KETONES,URINE              Negative Negative mg/dL    BILIRUBIN,URINE           Negative Negative                    OCCULT BLOOD,URINE        Moderate (A) Negative                    NITRITE                   Negative Negative                    LEUKOCYTE ESTERASE        Negative Negative                   URINALYSIS MICROSCOPIC      Result  Value Ref Range    RBC 6-10 (A) 0-2, None Seen /HPF    WBC 0-2 0-2, 3-5, None Seen /HPF    BACTERIA                  Moderate (A) None Seen, Rare, Occasional, Few Bacteria/HPF    EPITHELIAL CELLS          Few None Seen, Few Epi/HPF    OTHER Mucus Present      Results         PROCEDURE:  XR ABDOMEN 2 VIEW FLAT AND UPRIGHT OR DECUBITUS     HISTORY:  Fever and chills.     TECHNIQUE:  Upright and supine views of the abdomen were obtained.     COMPARISON:  CT abdomen pelvis 11/15/2016     FINDINGS:      There is a nonobstructive bowel gas pattern. No free air is seen. No abnormal calcifications are evident. There is moderate stool in the colon.     IMPRESSION:     No obstruction or free air.     Electronically Signed By: Xochitl Henao M.D. on 8/4/2017 4:16 PM     Results         PROCEDURE:  XR CHEST 2 VIEWS PA AND LATERAL     HISTORY: Fever and chills.     COMPARISON:  11/16/2016     FINDINGS:   The cardiac silhouette is normal in size. The pulmonary vasculature is normal.  Emphysematous changes are present. The lungs are otherwise clear. No pleural effusion or pneumothorax. There is a moderate hiatal hernia. Left-sided cardiac generator device is stable.     IMPRESSION:  No acute cardiopulmonary disease.       Electronically Signed By: Xochitl Henao M.D. on 8/4/2017 4:15 PM         Assessment    ICD-10-CM    1. Diverticulitis of large intestine without perforation or abscess without bleeding K57.32    2. Microscopic hematuria R31.29 URINALYSIS MICROSCOPIC   3. Diverticulosis of sigmoid colon K57.30 polyethylene glycoL (MIRALAX) 17 gram/dose powder   4. Post-nasal drainage R09.82    5. Constipation,  chronic K59.09 polyethylene glycoL (MIRALAX) 17 gram/dose powder   6. Myasthenia gravis (HC) G70.00        I am glad to hear he improved. A fever in an 89-year-old gentleman associated with a lactate of 2.2 could definitely have been a sign of a progressing infection. Fortunately he had significant improvement with Augmentin. Looking back I believe he had an acute bacterial diverticulitis. We discussed the potential for a recurrence of symptoms after antibiotics have completed and if that were to occur CT of the abdomen to look for abscess would be recommended at that point. We also discussed the risks and benefits of doing testing for microscopic hematuria and he says he would consider doing further testing and treatments if recommended. I also believe that his chronic constipation and usual habits of not drinking too much water combined with some intermittent urinary incontinence plate into his constipation leading to diverticulitis.    Plan   -- Expected clinical course discussed   -- Medications and their side effects discussed  Patient Instructions    -- Try Neti pot 1-2x/day for phlegm   -- Daily Metamucil for diverticulosis   -- Start MiraLax daily   -- Complete Augmentin   -- Eat yogurt 1-2 times per day while on antibiotics (and for a few weeks after) to reduce the chances of diarrhea   -- Move up your Protime check due to antibiotics   -- Lab only urine test in 2 weeks   -- If red cells remain, will refer to Dr. Tavarez of Urology         Index   Blood in Urine   ________________________________________________________________________  KEY POINTS    Even a small amount of blood can make the urine look pink, red, or sometimes brown. Sometimes the blood can be seen only with a microscope. Blood in the urine should always be checked by your healthcare provider.    Your healthcare provider will recommend treatment and follow-up depending on the results of your medical history, your exam and test results.    Ask  your provider how and when you will hear your test results and how to take care of yourself at home.  ________________________________________________________________________  What does blood in the urine look like?  Even a small amount of blood can make the urine look pink, red, or sometimes brown. Sometimes the blood can be seen only with a microscope.  Blood in the urine should always be checked by your healthcare provider.  What causes blood in urine?  The urinary tract includes the:    Kidneys, which make urine    Ureters, which are the tubes that carry urine from the kidneys to the bladder    Bladder, which stores urine    Urethra, which is the tube that drains urine from the bladder  A number of things may cause bleeding in the urinary tract. Some common causes of blood in the urine are:    Kidney or bladder infection    Bladder or kidney stones    An infection of the prostate gland or epididymis (the firm tube at the back of a boy s or man s testicle)    Intense exercise    A reaction of your immune system after an infection, such as a cold    Injury to any part of the urinary tract (for example, a fall might bruise the kidney)    Kidney disease    Some diseases, such as sickle cell anemia or lupus    Some medicines, like antibiotics or medicines that help prevent blood clots    A tumor in the urinary tract  Certain kinds of foods, such as beets or blackberries, may give the urine a reddish tint. This should last only for a day or so after eating these foods. A few medicines may also turn the urine reddish. If you have started a new medicine and notice a color change in your urine, call your pharmacist to see if it s normal.  How is it diagnosed?  Your healthcare provider will ask about other symptoms and your medical history and examine you. Tests may include:    Urine tests    Blood tests    An ultrasound, which uses sound waves to show pictures of the bladder and kidneys    Intravenous pyelogram, which  uses dye and X-rays to take pictures of the urinary tract    Cystoscopy, which uses a small lighted tube passed through the urethra into the bladder to look at the bladder, urethra, and prostate gland  How is it treated?  The treatment of blood in the urine depends on its cause. Your healthcare provider will recommend treatment and follow-up depending on the results of your medical history, your exam and test results.  How can I take care of myself?  Ask your provider:    How and when you will get your test results    How long it will take to recover    If there are activities you should avoid and when you can return to your normal activities    How to take care of yourself at home    What symptoms or problems you should watch for and what to do if you have them  Make sure you know when you should come back for a checkup. Keep all appointments for provider visits or tests.  Developed by [employer].  Adult Advisor 2016.3 published by [employer].  Last modified: 2016-06-07  Last reviewed: 2014-12-31  This content is reviewed periodically and is subject to change as new health information becomes available. The information is intended to inform and educate and is not a replacement for medical evaluation, advice, diagnosis or treatment by a healthcare professional.  References   Adult Advisor 2016.3 Index    Copyright   2016 [employer], a division of [employer] All rights reserved.               Signed, Derick Brock MD  Internal Medicine & Pediatrics

## 2017-12-28 NOTE — PATIENT INSTRUCTIONS
Patient Information     Patient Name Ambrose Clemens [id number] Male 8/10/1927      Patient Instructions by Derick Brock MD at 2017 12:45 PM     Author:  Derick Brock MD Service:  (none) Author Type:  Physician     Filed:  2017  1:19 PM Encounter Date:  2017 Status:  Signed     :  Derick Brock MD (Physician)             -- No change to evening mestinon dose   -- Increase day time mestinon up to 60 mg twice daily   -- Low threshold to see Dr. Gramajo ([facility]) if symptoms worsen or other concerns

## 2017-12-28 NOTE — TELEPHONE ENCOUNTER
Patient Information     Patient Name MRN Ambrose Chavez [id number] Male 8/10/1927      Telephone Encounter by Neela Davey at 11/10/2017 11:34 AM     Author:  Neela Davey Service:  (none) Author Type:  (none)     Filed:  11/10/2017 11:35 AM Encounter Date:  11/10/2017 Status:  Signed     :  Neela Davey            Patient scheduled for Tuesday the 14th.  Neela Davey LPN ....................  11/10/2017   11:35 AM

## 2017-12-28 NOTE — TELEPHONE ENCOUNTER
Patient Information     Patient Name MRN Ambrose Chavez [id number] Male 8/10/1927      Telephone Encounter by Anna Win at 2017  3:55 PM     Author:  Anna Win Service:  (none) Author Type:  (none)     Filed:  2017  4:01 PM Encounter Date:  2017 Status:  Signed     :  Anna Win            UA results in from yesterday, PCP is out of office until tomorrow, please advise wife called wanting results. Anna Win LPN......................2017 4:01 PM

## 2017-12-28 NOTE — TELEPHONE ENCOUNTER
Patient Information     Patient Name MRN Ambrose Chavez [id number] Male 8/10/1927      Telephone Encounter by Nolan Zelaya MD at 2017  4:31 PM     Author:  Nolan Zelaya MD Service:  (none) Author Type:  Physician     Filed:  2017  4:32 PM Encounter Date:  2017 Status:  Signed     :  Nolan Zelaya MD (Physician)            No obvious bladder infection noted.  There were some red blood cells in the urine.  If he has not seen urology, would be something to consider.  I will defer this to his primary provider.    Nolan Zelaya MD

## 2017-12-28 NOTE — TELEPHONE ENCOUNTER
Patient Information     Patient Name MRN Ambrose Chavez [id number] Male 8/10/1927      Telephone Encounter by Anna Win at 2017  9:43 AM     Author:  Anna Win Service:  (none) Author Type:  (none)     Filed:  2017  9:44 AM Encounter Date:  2017 Status:  Signed     :  Anna Win            Spoke with patient's wife, they are concerned about the blood in his urine and would like to talk with Derick Brock MD, per his nurse was able to get him in tomorrow am at 9:30 am. Anna Win LPN......................2017 9:44 AM

## 2017-12-28 NOTE — PROGRESS NOTES
Patient Information     Patient Name MRN Sex Ambrose Ferris [id number] Male 8/10/1927      Progress Notes by Simin Bravo MD at 2017  2:45 PM     Author:  Simin Bravo MD Service:  (none) Author Type:  Physician     Filed:  2017  5:21 PM Encounter Date:  2017 Status:  Signed     :  Simni Bravo MD (Physician)            SUBJECTIVE:    Ambrose Ni is a 89 y.o. male who presents for evaluation of fever.  Nursing Notes:   Ching Diop  2017  2:52 PM  Signed  Patient here for c/o sudden onset of fever last night, up to almost 102. Went back down around bedtime and was normal earlier today. Denies any pain or feeling ill.  Ching Diop LPN..............................2017  2:48 PM      HPI  Ambrose Ni is a 89 y.o. male in from home with his wife for evaluation of fever.  Onset last night of chills. With the chills he was tired, took a nap which is not normal for him. Temp was up to 101 after he had taken a nap.  He had soup for supper, able to this fine..  Wife says temp was normal the rest of the night.  Slept like regular self last night.  Increased yawning today and last night.  Had oatmeal for breakfast.  No nausea, vomiting or diarrhea. Mild abdomen pain.  Tylenol given last night.   Has had increased frequency of urination and incontinence.    Denies bone/joint pain.  Some throat clearing, but not day cough.  Denies headache.      No rash, but has a bug bite on left thigh.      Allergies      Allergen   Reactions     Mydriacyl [Tropicamide]  Angioedema     Other [Unlisted Allergen (Include Detail In Comments)]  Edema     Neosynephrine      Phenylephrine  Edema     Sulfa (Sulfonamide Antibiotics)  Other - Describe In Comment Field     Abnormal Kidney and liver functioning       Nuts [Tree Nut]  GI Upset     Macadamia nuts    ,   Past Medical History:     Diagnosis  Date     Aortic valve insufficiency      Congestion of nasal sinus     With  chronic cough      Elevated cholesterol     220-240,       History of acute renal failure     Sulfa induced, resolved.      History of tobacco abuse     Past history of 25 years      RECTAL BLEEDING 9/21/2011     SYNCOPE, HX OF 11/2/2011    and   Patient Active Problem List       Diagnosis  Date Noted     Diverticulosis of sigmoid colon  11/29/2016     Diverticulitis of large intestine  11/15/2016     Myasthenia gravis (HC)  11/14/2016     Panlobular emphysema (HC)  11/14/2016     Hiatal hernia  11/14/2016     Anticoagulation monitoring, INR range 2-3 [V58.61]  07/16/2015     Cardiac pacemaker  05/15/2015     Benign prostatic hypertrophy without lower urinary tract symptoms (LUTS)   05/13/2014     Warfarin anticoagulation  05/13/2014     Post herpetic neuralgia - right flank  05/13/2014     Pruritus  10/29/2013     Cataracts, both eyes  08/28/2012     ATRIAL FLUTTER, CHRONIC  09/02/2010     STASIS DERMATITIS, CHRONIC  05/13/2010     DUPUYTREN'S CONTRACTURE, BILATERAL  05/13/2010     HYPERTENSION       Hypertension, treated with Captopril for years          DISC DISEASE, LUMBAR       DIVERTICULOSIS, COLON       Extensive diffuse diverticulosis.          HEPATITIS       Hepatitis, improved          HYPERLIPIDEMIA       AORTIC INSUFFICIENCY       Trivial aortic insufficiency by echocardiogram          BPH (benign prostatic hyperplasia)       Benign prostatic hypertrophy            REVIEW OF SYSTEMS:  Review of Systems   Constitutional: Positive for chills and fever.   Respiratory: Negative for cough.    Cardiovascular: Negative for chest pain.        Pacemaker   Gastrointestinal: Positive for constipation. Negative for diarrhea and nausea.        Chronic constipation, he frequently manually disimpact himself   Genitourinary: Positive for frequency and urgency. Negative for flank pain and hematuria.   Musculoskeletal: Negative for myalgias.   Skin: Negative for rash.   Neurological: Positive for headaches.  Negative for focal weakness.        Has increased generalized weakness due to myasthenia       OBJECTIVE:  /80  Pulse 88  Temp 100.7  F (38.2  C) (Tympanic)  Wt 79.6 kg (175 lb 6.4 oz)  BMI 27.47 kg/m2    EXAM:   Physical Exam   Constitutional: He is well-developed, well-nourished, and in no distress.   HENT:   Head: Normocephalic and atraumatic.   Tympanic membranes are normal   Cardiovascular: Normal rate and regular rhythm.    Pulmonary/Chest:   Crackles at bases bilaterally; pacemaker    Abdominal: Soft. Bowel sounds are normal. He exhibits no distension. There is tenderness.   Mild left upper and lower quadrant tenderness   Musculoskeletal: He exhibits no tenderness.   Lymphadenopathy:     He has no cervical adenopathy.   Neurological: He is alert.   Skin:   1 mm mildly erythematous macule/papule left medial thigh   Nursing note and vitals reviewed.    Results for orders placed or performed in visit on 08/04/17      COMPLETE METABOLIC PANEL      Result  Value Ref Range    SODIUM 136 133 - 143 mmol/L    POTASSIUM 4.8 3.5 - 5.1 mmol/L    CHLORIDE 102 98 - 107 mmol/L    CO2,TOTAL 25 21 - 31 mmol/L    ANION GAP 9 5 - 18                    GLUCOSE 105 70 - 105 mg/dL    CALCIUM 9.7 8.6 - 10.3 mg/dL    BUN 21 7 - 25 mg/dL    CREATININE 0.97 0.70 - 1.30 mg/dL    BUN/CREAT RATIO           22                    GFR if African American >60 >60 ml/min/1.73m2    GFR if not African American >60 >60 ml/min/1.73m2    ALBUMIN 4.3 3.5 - 5.7 g/dL    PROTEIN,TOTAL 7.9 6.4 - 8.9 g/dL    GLOBULIN                  3.6 2.0 - 3.7 g/dL    A/G RATIO 1.2 1.0 - 2.0                    BILIRUBIN,TOTAL 1.5 (H) 0.3 - 1.0 mg/dL    ALK PHOSPHATASE 66 34 - 104 IU/L    ALT (SGPT) 10 7 - 52 IU/L    AST (SGOT) 22 13 - 39 IU/L   LACTATE,BLOOD      Result  Value Ref Range    LACTATE,BLOOD 2.2 0.5 - 2.2 mmol/L   PROTIME-INR      Result  Value Ref Range    INR 2.5 (H) <1.3    PROTIME 27.5 (H) 11.9 - 15.2 sec   CBC WITH AUTO DIFFERENTIAL       Result  Value Ref Range    WHITE BLOOD COUNT         9.5 4.5 - 11.0 thou/cu mm    RED BLOOD COUNT           4.29 (L) 4.30 - 5.90 mil/cu mm    HEMOGLOBIN                12.0 (L) 13.5 - 17.5 g/dL    HEMATOCRIT                38.0 37.0 - 53.0 %    MCV                       89 80 - 100 fL    MCH                       28.0 26.0 - 34.0 pg    MCHC                      31.6 (L) 32.0 - 36.0 g/dL    RDW                       14.6 11.5 - 15.5 %    PLATELET COUNT            177 140 - 440 thou/cu mm    MPV                       11.8 (H) 6.5 - 11.0 fL    NEUTROPHILS               67.7 42.0 - 72.0 %    LYMPHOCYTES               21.7 20.0 - 44.0 %    MONOCYTES                 10.0 <12.0 %    EOSINOPHILS               0.3 <8.0 %    BASOPHILS                 0.1 <3.0 %    IMMATURE GRANULOCYTES(METAS,MYELOS,PROS) 0.2 %    ABSOLUTE NEUTROPHILS      6.5 1.7 - 7.0 thou/cu mm    ABSOLUTE LYMPHOCYTES      2.1 0.9 - 2.9 thou/cu mm    ABSOLUTE MONOCYTES        1.0 (H) <0.9 thou/cu mm    ABSOLUTE EOSINOPHILS      0.0 <0.5 thou/cu mm    ABSOLUTE BASOPHILS        0.0 <0.3 thou/cu mm    ABSOLUTE IMMATURE GRANULOCYTES(METAS,MYELOS,PROS) 0.0 <=0.3 thou/cu mm   URINALYSIS W REFLEX MICROSCOPIC IF POSITIVE      Result  Value Ref Range    COLOR                     Yellow Yellow Color    CLARITY                   Clear Clear Clarity    SPECIFIC GRAVITY,URINE    1.015 1.010, 1.015, 1.020, 1.025                    PH,URINE                  7.5 6.0, 7.0, 8.0, 5.5, 6.5, 7.5, 8.5                    UROBILINOGEN,QUALITATIVE  Normal Normal EU/dl    PROTEIN, URINE 100 (A) Negative mg/dL    GLUCOSE, URINE Negative Negative mg/dL    KETONES,URINE             Negative Negative mg/dL    BILIRUBIN,URINE           Negative Negative                    OCCULT BLOOD,URINE        Moderate (A) Negative                    NITRITE                   Negative Negative                    LEUKOCYTE ESTERASE        Negative Negative                   URINALYSIS MICROSCOPIC       Result  Value Ref Range    RBC 6-10 (A) 0-2, None Seen /HPF    WBC 0-2 0-2, 3-5, None Seen /HPF    BACTERIA                  Moderate (A) None Seen, Rare, Occasional, Few Bacteria/HPF    EPITHELIAL CELLS          Few None Seen, Few Epi/HPF    OTHER Mucus Present      Chest x-ray:  Large hiatal hernia, pacemaker leads in place.  Abdominal x-ray: Moderate amount of stool, no free air  ASSESSMENT/PLAN:    ICD-10-CM    1. Fever and chills R50.9 CBC WITH DIFFERENTIAL      COMPLETE METABOLIC PANEL      XR CHEST 2 VIEWS PA AND LATERAL      XR ABDOMEN 2 VIEW FLAT AND UPRIGHT OR DECUBITUS      URINALYSIS W REFLEX MICROSCOPIC IF POSITIVE      LACTATE,BLOOD      CBC WITH DIFFERENTIAL      COMPLETE METABOLIC PANEL      LACTATE,BLOOD      CBC WITH AUTO DIFFERENTIAL      URINALYSIS W REFLEX MICROSCOPIC IF POSITIVE      URINALYSIS MICROSCOPIC      URINALYSIS MICROSCOPIC      URINE CULTURE      URINE CULTURE      amoxicillin-clavulanate 875-125 mg tablet (AUGMENTIN)   2. Anticoagulation monitoring, INR range 2-3 [V58.61] Z79.01 PROTIME-INR      PROTIME-INR   3. Hematuria R31.9 amoxicillin-clavulanate 875-125 mg tablet (AUGMENTIN)   4. Diverticulitis of intestine without perforation or abscess without bleeding, unspecified part of intestinal tract K57.92 amoxicillin-clavulanate 875-125 mg tablet (AUGMENTIN)        Plan:  1. Differential diagnosis of his fevers discussed with patient and his wife. This includes urinary tract infection, diverticulitis, vector borne illness, respiratory illness.  His most prominent finding on exam is left-sided abdominal tenderness. His main symptom, other than the fevers and chills itself, or changes in urination. His blood pressure is stable. Lactate is within normal range. No evidence of end organ dysfunction. After discussing options with patient and his wife, their main concern is for recurrent diverticulitis. At this time, he'll be treated with Augmentin 875 mg twice a day. Medication side  effects are reviewed. Urine is sent for culture. Patient instructions as follows:  Patient Instructions   A urine culture is being done.  The results of this come back this weekend.   If this is negative, then a follow up urine test should be done once you're done with the antibiotics.      Antibiotics will be started today.  If you have more fevers/chills, nausea, vomiting or diarrhea or severe pain - come to the ER this weekend.      Symptomatic care with over-the-counter analgesics, rest, fluids. Discussed low threshold for repeat evaluation this weekend if not improving.    Simin Bravo MD

## 2017-12-28 NOTE — TELEPHONE ENCOUNTER
Patient Information     Patient Name MRN Ambrose Chavez [id number] Male 8/10/1927      Telephone Encounter by Ysabel Birmingham at 2017  9:31 AM     Author:  Ysabel Birmingham Service:  (none) Author Type:  (none)     Filed:  2017  9:32 AM Encounter Date:  2017 Status:  Signed     :  Ysabel Birmingham            Assessment  Mr. Ni is a 89 y.o. male with microscopic hematuria  Relatively recent CT was negative.     Discussed rationale for work up.  Discussed potential findings of hematuria work up including, but not limited to, kidney stones, bladder tumors and/or kidney tumors.  Also discussed the potential for a normal work up.     Had a long discussion with the patient and his daughter.  He would prefer to defer any additional testing at this time, but rather, follow-up for repeat urinalysis     Plan  Follow up for repeat urinalysis in 6 weeks.  If this is positive for microscopic hematuria - will perform cystoscopy at that visit.

## 2017-12-28 NOTE — TELEPHONE ENCOUNTER
Patient Information     Patient Name MRN Ambrose Chavez [id number] Male 8/10/1927      Telephone Encounter by Imelda Ratliff RN at 2017  1:54 PM     Author:  Imelda Ratliff RN Service:  (none) Author Type:  NURS- Registered Nurse     Filed:  2017  1:56 PM Encounter Date:  2017 Status:  Signed     :  Imelda Ratliff RN (NURS- Registered Nurse)            Anticoagulant    Office visit in the past 12 months.    Last visit with SAROJ CRAIG was on: 2017 in [facility]  Next visit with SAROJ CRAIG is on: 2017 in [facility]  Next visit with [facility] is on: 2017 in [facility]    Lab tests:  PT/INR at least monthly    INR (no units)    Date Value   2017 1.8 (H)     HEMOGLOBIN                (g/dL)    Date Value   2016 12.7 (L)     Prescription refilled per RN Medication Refill Policy.................... IMELDA RATLIFF RN ....................  2017   1:54 PM

## 2017-12-28 NOTE — TELEPHONE ENCOUNTER
Patient Information     Patient Name MRN Ambrose Chavez [id number] Male 8/10/1927      Telephone Encounter by Betsy Montalvo RN at 2017  3:24 PM     Author:  Betsy Montalvo RN Service:  (none) Author Type:  NURS- Registered Nurse     Filed:  2017  3:30 PM Encounter Date:  2017 Status:  Signed     :  Betsy Montalvo RN (NURS- Registered Nurse)            Beta Blockers     Office visit in the past 12 months or per provider note.    Last visit with Derick Brock MD was on: 2017 in [facility]  Next visit with Derick Brock MD  is on: No future appointment listed with this provider  Next visit with [facility] is on: 10/09/2017 in [facility]    Max refill for 12 months from last office visit or per provider note.  Prescription refilled per RN Medication Refill Policy.................... BETSY MONTALVO RN ....................  2017   3:30 PM

## 2017-12-28 NOTE — PROGRESS NOTES
Patient Information     Patient Name MRN Ambrose Chavez [id number] Male 8/10/1927      Progress Notes by Kyle Tavarez MD at 10/9/2017 10:15 AM     Author:  Kyle Tavarez MD Service:  (none) Author Type:  Physician     Filed:  10/9/2017 10:24 AM Encounter Date:  10/9/2017 Status:  Signed     :  Kyle Tavarez MD (Physician)            Type of Visit  EST    Chief Complaint  Hematuria    HPI  Mr. Ni is a 89 y.o. male who follows up with recently identified abnormal but low level of microhematuria.  He denies any associated symptoms such as dysuria, urgency, frequency or pelvic pain.  At the last visit we elected to follow up with the repeat UA and if positive for blood and proceed with workup.  He did have a CT scan almost 1 year ago which was negative.  He denies a history of gross hematuria.  He has no other complaints and feels quite well today.      Review of Systems  I personally reviewed the ROS with the patient.    Nursing Notes:   Tiffanie Campos  10/9/2017 10:13 AM  Signed  Patient is here today for follow up on UA  Review of Systems:    Weight loss:    No     Recent fever/chills:  No   Night sweats:   No  Current skin rash:  No   Recent hair loss:  No  Heat intolerance:  No   Cold intolerance:  No  Chest pain:   No   Palpitations:   No  Shortness of breath:  No   Wheezing:   No  Constipation:    No   Diarrhea:   No   Nausea:   No   Vomiting:   No   Kidney/side pain:  No   Back pain:   No  Frequent headaches:  No   Dizziness:     No  Leg swelling:   No   Calf pain:    No    Tiffanie Campos LPN 10/9/2017 10:03 AM        Physical Exam  Vitals:     10/09/17 1004   BP: 132/78   Pulse: 69   Weight: 80.7 kg (178 lb)     Constitutional: No acute distress.  Alert and cooperative   Head: NCAT  Eyes: Conjunctivae normal  Cardiovascular: Regular rate.  Pulmonary/Chest: Respirations are even and non-labored bilaterally, no audible wheezing  Abdominal: Soft. No distension, tenderness, masses or  guarding.   Neurological: A + O x 3.  Cranial Nerves II-XII grossly intact.  Extremities: ZULMA x 4, Warm. No clubbing.  No cyanosis.    Skin: Pink, warm and dry.  No visible rashes noted.  Psychiatric:  Normal mood and affect  Back:  No left CVA tenderness.  No right CVA tenderness.  Genitourinary:  Nonpalpable bladder    Labs  CREATININE (mg/dL)    Date Value   08/04/2017 0.97     Results for EDUAR TAYLOR (MRN [id number]) as of 10/9/2017 10:20   8/22/2017 09:30 10/9/2017 09:38   COLOR                     Yellow Yellow   CLARITY                   Clear Clear   SPECIFIC GRAVITY,URINE    >=1.030 (A) >=1.030 (A)   PH,URINE                  5.5 5.5   UROBILINOGEN,QUALITATIVE  Normal Normal   PROTEIN, URINE Trace (A) Negative   GLUCOSE, URINE Negative Negative   KETONES,URINE             Trace (A) Negative   BILIRUBIN,URINE           Negative Negative   OCCULT BLOOD,URINE        Small (A) Small (A)   NITRITE                   Negative Negative   LEUKOCYTE ESTERASE        Negative Negative   RBC 3-5 (A) 0-2   WBC None Seen None Seen   BACTERIA Few Few   EPITHELIAL CELLS Few Few   HYALINE CASTS 3-5    OTHER Mucus Present Mucus Present         Imaging  CT a/p   11/15/2016  I personally reviewed and interpreted the images and report.  IMPRESSION:  1.   Sigmoid diverticulosis with mild fat stranding suggesting mild diverticulitis. No distinct evidence of perforation.  2.  Enlarged, heterogeneously dense prostate gland, felt to be related to recent TURP procedure.  3.  5 cm cortical cyst of the left kidney.  4.  No acute change within the chest. The large hiatal hernia, emphysema, pleural-based nodule has well as prominent right hilar lymph node are similar in appearance.    Assessment & Plan  Mr. Taylor is a 89 y.o. male with abnormal but low level of microscopic hematuria at the last visit who follows up today with repeat UA revealing no blood.  All things considered we will proceed with observation given the recent labs  value.  The patient and his wife are very supportive of this plan.  I did discuss hematuria and the indications for workup as well as potential findings.  follow-up as needed

## 2017-12-28 NOTE — PROGRESS NOTES
Patient Information     Patient Name MRN Ambrose Chavez [id number] Male 8/10/1927      Progress Notes by Simin Bravo MD at 2017  4:56 PM     Author:  Simin Bravo MD Service:  (none) Author Type:  Physician     Filed:  2017  4:56 PM Date of Service:  2017  4:56 PM Status:  Signed     :  Simin Bravo MD (Physician)            Results given during clinic visit.  Simin Bravo MD ....................  2017   4:56 PM

## 2017-12-29 NOTE — PATIENT INSTRUCTIONS
Patient Information     Patient Name MRN Ambrose Chavez [id number] Male 8/10/1927      Patient Instructions by Elba Pereira RN at 2017  3:15 PM     Author:  Elba Pereira RN Service:  (none) Author Type:  NURS- Registered Nurse     Filed:  2017  2:55 PM Encounter Date:  2017 Status:  Signed     :  Elba Pereira RN (NURS- Registered Nurse)            2017 Details    Sun Mon Tue Wed Thu Fri Sat       1               2               3               4               5                 6               7               8               9               10               11               12                 13               14               15               16               17               18               19                 20               21               22               23               24               25               26                 27               28               29               30               31      3 mg   See details         Date Details    This INR check               How to take your warfarin dose     To take:  3 mg Take one and a half of the 2 mg tablets.           2017 Details    Sun Mon Tue Wed Thu Fri Sat          1      3 mg         2      3 mg           3      3 mg         4      3 mg         5      3 mg         6      3 mg         7      3 mg         8      3 mg         9      3 mg           10      3 mg         11      3 mg         12      3 mg         13      3 mg         14      3 mg         15      3 mg         16      3 mg           17      3 mg         18      3 mg         19      3 mg         20      3 mg         21      3 mg         22      3 mg         23      3 mg           24      3 mg         25      3 mg         26      3 mg         27      3 mg         28      3 mg         29      3 mg         30      3 mg          Date Details   No additional details            How to take your warfarin dose     To take:  3 mg Take one and a  half of the 2 mg tablets.           October 2017 Details    Sun Mon Tue Wed Thu Fri Sat     1      3 mg         2      3 mg         3      3 mg         4      3 mg         5      3 mg         6      3 mg         7      3 mg           8      3 mg         9      3 mg         10               11               12               13               14                 15               16               17               18               19               20               21                 22               23               24               25               26               27               28                 29               30               31                    Date Details   No additional details    Date of next INR:  10/9/2017         How to take your warfarin dose     To take:  3 mg Take one and a half of the 2 mg tablets.             Description          Continue same dose and recheck in 4 weeks. ...............Elba Pereira RN    8/31/2017  2:54 PM                      Anticoagulation Summary as of 8/31/2017     INR goal 2.0-3.0    Today's INR 2.1    Next INR check 10/9/2017          Call your Anticoagulation Clinic at Dept: [phone]   if:   1. Any medications are started, stopped, or there is a change in dose.  2. You experience any bleeding that is not easily stopped or if it is recurrent.  3. You notice an increase in bruising or any bruising that does not heal.  4. You are scheduled for surgery, colonoscopy, dental extraction or any other procedure where you may need to stop your Coumadin (warfarin).

## 2017-12-29 NOTE — PATIENT INSTRUCTIONS
Patient Information     Patient Name MRN Ambrose Chavez [id number] Male 8/10/1927      Patient Instructions by Elba Pereira RN at 8/3/2017  9:15 AM     Author:  Elba Pereira RN Service:  (none) Author Type:  NURS- Registered Nurse     Filed:  8/3/2017  8:45 AM Encounter Date:  8/3/2017 Status:  Signed     :  Elba Pereira RN (NURS- Registered Nurse)            2017 Details    Sun  Thu Fri Sat       1               2               3      3 mg   See details      4      3 mg         5      3 mg           6      3 mg         7      3 mg         8      3 mg         9      3 mg         10      3 mg         11      3 mg         12      3 mg           13      3 mg         14      3 mg         15      3 mg         16      3 mg         17      3 mg         18      3 mg         19      3 mg           20      3 mg         21      3 mg         22      3 mg         23      3 mg         24      3 mg         25      3 mg         26      3 mg           27      3 mg         28      3 mg         29      3 mg         30      3 mg         31      3 mg            Date Details    This INR check       Date of next INR:  2017         How to take your warfarin dose     To take:  3 mg Take one and a half of the 2 mg tablets.             Description          Continue same dose and recheck in 4 weeks. ...............Elba Pereira RN    8/3/2017    8:44 AM                          Anticoagulation Summary as of 8/3/2017     INR goal 2.0-3.0    Today's INR 2.5    Next INR check 2017          Call your Anticoagulation Clinic at Dept: [phone]   if:   1. Any medications are started, stopped, or there is a change in dose.  2. You experience any bleeding that is not easily stopped or if it is recurrent.  3. You notice an increase in bruising or any bruising that does not heal.  4. You are scheduled for surgery, colonoscopy, dental extraction or any other procedure where you may need to  stop your Coumadin (warfarin).

## 2017-12-29 NOTE — PATIENT INSTRUCTIONS
Patient Information     Patient Name MRN Ambrose Chavez [id number] Male 8/10/1927      Patient Instructions by Elba Pereira RN at 10/9/2017  9:15 AM     Author:  Elba Pereira RN Service:  (none) Author Type:  NURS- Registered Nurse     Filed:  10/9/2017  9:13 AM Encounter Date:  10/9/2017 Status:  Signed     :  Elba Pereira RN (NURS- Registered Nurse)            2017 Details    Sun  Fri Sat     1               2               3               4               5               6               7                 8               9      3 mg   See details      10      3 mg         11      3 mg         12      3 mg         13      3 mg         14      3 mg           15      3 mg         16      3 mg         17      3 mg         18      3 mg         19      3 mg         20      3 mg         21      3 mg           22      3 mg         23      3 mg         24               25               26               27               28                 29               30               31                    Date Details   10/09 This INR check       Date of next INR:  10/23/2017         How to take your warfarin dose     To take:  3 mg Take one and a half of the 2 mg tablets.             Description          Continue same dose and recheck in 2-3 weeks. ..............Elba Pereira RN   10/9/2017    9:12 AM                      Anticoagulation Summary as of 10/9/2017     INR goal 2.0-3.0    Today's INR 1.8    Next INR check 10/23/2017          Call your Anticoagulation Clinic at Dept: [phone]   if:   1. Any medications are started, stopped, or there is a change in dose.  2. You experience any bleeding that is not easily stopped or if it is recurrent.  3. You notice an increase in bruising or any bruising that does not heal.  4. You are scheduled for surgery, colonoscopy, dental extraction or any other procedure where you may need to stop your Coumadin (warfarin).

## 2017-12-29 NOTE — PATIENT INSTRUCTIONS
Patient Information     Patient Name MRN Ambrose Chavez [id number] Male 8/10/1927      Patient Instructions by Imelda Ratliff RN at 2017  9:15 AM     Author:  Imelda Ratliff RN Service:  (none) Author Type:  NURS- Registered Nurse     Filed:  2017  9:10 AM Encounter Date:  2017 Status:  Signed     :  Imelda Ratliff RN (NURS- Registered Nurse)            2017 Details    Sun Mon Tue Wed Thu Fri Sat           1                 2               3               4               5               6               7               8                 9               10               11               12               13               14               15                 16               17               18               19               20      3 mg   See details      21      3 mg         22      3 mg           23      3 mg         24      3 mg         25      3 mg         26      3 mg         27      3 mg         28      3 mg         29      3 mg           30      3 mg         31      3 mg               Date Details    This INR check               How to take your warfarin dose     To take:  3 mg Take one and a half of the 2 mg tablets.           2017 Details    Sun Mon Tue Wed Thu Fri Sat       1      3 mg         2      3 mg         3      3 mg         4               5                 6               7               8               9               10               11               12                 13               14               15               16               17               18               19                 20               21               22               23               24               25               26                 27               28               29               30               31                  Date Details   No additional details    Date of next INR:  8/3/2017         How to take your warfarin dose     To take:  3 mg Take one and a half of the 2  mg tablets.             Description          Increase Warfarin/Coumadin and recheck INR in 2 weeks.  LINDY KOO RN ....................  7/20/2017   9:09 AM                          Anticoagulation Summary as of 7/20/2017     INR goal 2.0-3.0    Today's INR 1.8!    Next INR check 8/3/2017          Call your Anticoagulation Clinic at Dept: [phone]   if:   1. Any medications are started, stopped, or there is a change in dose.  2. You experience any bleeding that is not easily stopped or if it is recurrent.  3. You notice an increase in bruising or any bruising that does not heal.  4. You are scheduled for surgery, colonoscopy, dental extraction or any other procedure where you may need to stop your Coumadin (warfarin).

## 2017-12-29 NOTE — PATIENT INSTRUCTIONS
Patient Information     Patient Name MRN Ambrose Chavez [id number] Male 8/10/1927      Patient Instructions by Imelda Ratliff RN at 6/15/2017  9:15 AM     Author:  Imelda Ratliff RN Service:  (none) Author Type:  NURS- Registered Nurse     Filed:  6/15/2017  9:13 AM Encounter Date:  6/15/2017 Status:  Signed     :  Imelda Ratliff RN (NURS- Registered Nurse)            2017 Details    Sun Mon Tue Wed Thu Fri Sat         1               2               3                 4               5               6               7               8               9               10                 11               12               13               14               15      3 mg   See details      16      3 mg         17      3 mg           18      3 mg         19      2 mg         20      3 mg         21      3 mg         22      3 mg         23      3 mg         24      3 mg           25      3 mg         26      2 mg         27      3 mg         28      3 mg         29      3 mg         30      3 mg           Date Details   06/15 This INR check               How to take your warfarin dose     To take:  2 mg Take one of the 2 mg tablets.    To take:  3 mg Take one and a half of the 2 mg tablets.           2017 Details    Sun Mon Tue Wed Thu Fri Sat           1      3 mg           2      3 mg         3      2 mg         4      3 mg         5      3 mg         6      3 mg         7      3 mg         8      3 mg           9      3 mg         10      2 mg         11      3 mg         12      3 mg         13      3 mg         14      3 mg         15      3 mg           16      3 mg         17      2 mg         18      3 mg         19      3 mg         20      3 mg         21               22                 23               24               25               26               27               28               29                 30               31                     Date Details   No additional details     Date of next INR:  7/20/2017         How to take your warfarin dose     To take:  2 mg Take one of the 2 mg tablets.    To take:  3 mg Take one and a half of the 2 mg tablets.             Description          Continue same dose and  recheck in one month.  LINDY KOO RN ....................  6/15/2017   9:13 AM                          Anticoagulation Summary as of 6/15/2017     INR goal 2.0-3.0    Today's INR 2.2    Next INR check 7/20/2017          Call your Anticoagulation Clinic at Dept: [phone]   if:   1. Any medications are started, stopped, or there is a change in dose.  2. You experience any bleeding that is not easily stopped or if it is recurrent.  3. You notice an increase in bruising or any bruising that does not heal.  4. You are scheduled for surgery, colonoscopy, dental extraction or any other procedure where you may need to stop your Coumadin (warfarin).

## 2017-12-29 NOTE — PATIENT INSTRUCTIONS
Patient Information     Patient Name MRN Ambrose Chavez [id number] Male 8/10/1927      Patient Instructions by Elba Pereira RN at 10/23/2017  9:15 AM     Author:  Elba Pereira RN Service:  (none) Author Type:  NURS- Registered Nurse     Filed:  10/23/2017  2:05 PM Encounter Date:  10/23/2017 Status:  Signed     :  Elba Pereira RN (NURS- Registered Nurse)            2017 Details    Sun Mon Tue Wed Thu Fri Sat     1               2               3               4               5               6               7                 8               9               10               11               12               13               14                 15               16               17               18               19               20               21                 22               23      4 mg   See details      24      3 mg         25      4 mg         26      3 mg         27      3 mg         28      3 mg           29      3 mg         30      4 mg         31      3 mg              Date Details   10/23 This INR check               How to take your warfarin dose     To take:  3 mg Take one and a half of the 2 mg tablets.    To take:  4 mg Take two of the 2 mg tablets.           2017 Details    Sun Mon Tue Wed Thu Fri Sat        1      4 mg         2      3 mg         3      3 mg         4      3 mg           5      3 mg         6      4 mg         7               8               9               10               11                 12               13               14               15               16               17               18                 19               20               21               22               23               24               25                 26               27               28               29               30                  Date Details   No additional details    Date of next INR:  2017         How to take your warfarin dose     To take:  3  mg Take one and a half of the 2 mg tablets.    To take:  4 mg Take two of the 2 mg tablets.             Description          Increase dose and recheck in 2 weeks. .............Elba Pereira RN.......... 10/23/2017  2:05 PM                        Anticoagulation Summary as of 10/23/2017     INR goal 2.0-3.0    Today's INR 1.8    Next INR check 11/6/2017          Call your Anticoagulation Clinic at Dept: [phone]   if:   1. Any medications are started, stopped, or there is a change in dose.  2. You experience any bleeding that is not easily stopped or if it is recurrent.  3. You notice an increase in bruising or any bruising that does not heal.  4. You are scheduled for surgery, colonoscopy, dental extraction or any other procedure where you may need to stop your Coumadin (warfarin).

## 2017-12-29 NOTE — PATIENT INSTRUCTIONS
Patient Information     Patient Name MRN Ambrose Chavez [id number] Male 8/10/1927      Patient Instructions by Derick Brock MD at 2017  1:30 PM     Author:  Derick Brock MD  Service:  (none) Author Type:  Physician     Filed:  2017  2:20 PM  Encounter Date:  2017 Status:  Addendum     :  Derick Brock MD (Physician)        Related Notes: Original Note by Derick Brock MD (Physician) filed at 2017  2:15 PM             -- No change to meds   -- Keep a symptom diary   -- Follow-up with diary to discuss adjusting meds         Index   Myasthenia Gravis   ________________________________________________________________________  KEY POINTS    Myasthenia gravis is an autoimmune disease that affects your nerves and muscles.    There is no cure for myasthenia gravis, but treatment can help you manage your symptoms.    Take your medicine on an exact schedule. Any delay in taking medicine may leave you unable to swallow or breathe.  ________________________________________________________________________  What is myasthenia gravis?  Myasthenia gravis is an autoimmune disease that affects the connections between your nerves and muscles. An autoimmune disease is a disease that causes your body to mistakenly attack your own healthy tissue.  What is the cause?  The immune system is your body s defense against infection. Antibodies are proteins your immune system makes to help fight infections. When you have myasthenia gravis, your immune system makes antibodies that attack the place where your nerves connect with the muscles. In myasthenia gravis, these antibodies block chemicals that help you move your muscles. This makes your muscles very weak.  The cause of this disease is not well understood. It may start at any age. In women, it happens more often before age 40. In men, it is more likely after age 60.  Rarely, this disease is caused by tumors of the thymus gland. The thymus  is a small gland behind your breastbone that helps your body fight infection.  What are the symptoms?  The main symptom is muscle weakness that gets worse with activity and better with rest. Muscle weakness may cause other symptoms such as:    Eye problems, such as double vision or droopy eyelids    Trouble speaking, chewing, and swallowing    Feeling that your head is heavy    Change in facial expression    Trouble doing things that use the muscles of your arms or legs, such as climbing stairs or combing hair    Trouble breathing because of weakness in the chest muscles    Feeling tired all the time  Symptoms can change from day to day. Stress, infection, or other factors can make symptoms worse. You may have times when you don t have symptoms (remissions) and then your symptoms make come back (relapses).  How is it diagnosed?  Your healthcare provider will ask about your symptoms and medical history and examine you.  You may have tests such as:    Blood tests    Muscle strength test, which uses an injection of medicine (Tensilon) to see if it helps your muscle strength    EMG (electromyogram), which uses needles passed through your skin to send mild electric signals and check how your nerves and muscles respond  How is it treated?  There is no cure for myasthenia gravis, but treatment can help you manage your symptoms. Treatments may include:    Medicines to improve muscle strength by helping your muscles get signals from your nerves.    Steroids or other medicine to keep your immune system from making antibodies. Using a steroid for a long time can have serious side effects. Take steroid medicine exactly as your healthcare provider prescribes. Don't take more or less of it than prescribed by your provider and don't take it longer than prescribed. Don't stop taking a steroid without your provider's approval. You may have to lower your dosage slowly before stopping it.    Fast-acting medicines to treat your immune  system called IVIG or intravenous immune globulin    Exchanging your plasma, also called plasmapheresis, the fluid part of your blood that contains antibodies, with plasma that does not have these antibodies.    Surgery to take out part or all of your thymus gland if this is the cause of your myasthenia gravis.    If your muscles get so weak that you have trouble breathing, you may need emergency medical care and a breathing machine to help you breathe until your muscles are stronger.  How can I take care of myself?  Follow the full course of treatment prescribed by your healthcare provider. In addition:    If you are on drug therapy, it is very important to take your medicine on an exact schedule. Any delay in taking medicine may leave you unable to swallow or breathe. You may want to set an alarm clock to remind you to take your medicine.    Tell all healthcare providers who treat you that you have myasthenia gravis. Some medicines may change the way your MG medicines work. Some medicines can cause you to have severe breathing problems. If you have trouble swallowing or breathing, get emergency treatment right away.    You should carry an ID card or wear a medical ID bracelet or necklace that says you have myasthenia gravis. If you need emergency care, surgery, or lab tests, this helps the healthcare provider know how to treat you.    Learn to manage stress. Ask for help at home and work when the load is too great to handle. Find ways to relax. For example take up a hobby, listen to music, watch movies, or take walks. Try yoga, meditation, or deep breathing exercises when you feel stressed.    Get support. Consider a counselor or talk with family and friends. Join a support group in your area.    Ask your provider:    How and when you will get your test results    If there are activities you should avoid    How to take care of yourself at home    What symptoms or problems you should watch for and what to do if you  have them  Make sure you know when you should come back for a checkup. Keep all appointments for provider visits or tests.  For more information contact:    Myasthenia [facility] [place]  1-225.183.5427  http://www.myasthenia.org  Developed by [employer].  Adult Advisor 2016.3 published by [employer].  Last modified: 2016-03-23  Last reviewed: 2015-08-19  This content is reviewed periodically and is subject to change as new health information becomes available. The information is intended to inform and educate and is not a replacement for medical evaluation, advice, diagnosis or treatment by a healthcare professional.  References   Adult Advisor 2016.3 Index    Copyright   2016 [employer], a division of [employer] All rights reserved.

## 2017-12-29 NOTE — PATIENT INSTRUCTIONS
Patient Information     Patient Name MRN Ambrose Chavez [id number] Male 8/10/1927      Patient Instructions by Elba Pereira RN at 2017 10:00 AM     Author:  Elba Pereira RN Service:  (none) Author Type:  NURS- Registered Nurse     Filed:  2017 10:27 AM Encounter Date:  2017 Status:  Signed     :  Elba Pereira RN (NURS- Registered Nurse)            2017 Details    Sun  Fri Sat       1               2               3               4               5                 6               7               8      3 mg   See details      9      3 mg         10      3 mg         11      3 mg         12      3 mg           13      3 mg         14      3 mg         15      3 mg         16      3 mg         17      3 mg         18      3 mg         19      3 mg           20      3 mg         21      3 mg         22      3 mg         23      3 mg         24      3 mg         25      3 mg         26      3 mg           27      3 mg         28      3 mg         29      3 mg         30      3 mg         31      3 mg            Date Details    This INR check       Date of next INR:  2017         How to take your warfarin dose     To take:  3 mg Take one and a half of the 2 mg tablets.             Description          Continue same dose and recheck in 4 weeks. ...............Elba CARLIN. DAO Pereira    2017  10:25 AM                        Anticoagulation Summary as of 2017     INR goal 2.0-3.0    Today's INR 2.5    Next INR check 2017          Call your Anticoagulation Clinic at Dept: [phone]   if:   1. Any medications are started, stopped, or there is a change in dose.  2. You experience any bleeding that is not easily stopped or if it is recurrent.  3. You notice an increase in bruising or any bruising that does not heal.  4. You are scheduled for surgery, colonoscopy, dental extraction or any other procedure where you may need to stop your Coumadin  (warfarin).

## 2017-12-29 NOTE — PATIENT INSTRUCTIONS
Patient Information     Patient Name MRN Ambrose Chavez [id number] Male 8/10/1927      Patient Instructions by Derick Brock MD at 2017  9:30 AM     Author:  Derick Brock MD  Service:  (none) Author Type:  Physician     Filed:  2017  9:59 AM  Encounter Date:  2017 Status:  Addendum     :  Derick Brock MD (Physician)        Related Notes: Original Note by Derick Brock MD (Physician) filed at 2017  9:55 AM             -- Try Neti pot 1-2x/day for phlegm   -- Daily Metamucil for diverticulosis   -- Start MiraLax daily   -- Complete Augmentin   -- Eat yogurt 1-2 times per day while on antibiotics (and for a few weeks after) to reduce the chances of diarrhea   -- Move up your Protime check due to antibiotics   -- Lab only urine test in 2 weeks   -- If red cells remain, will refer to Dr. Tavarez of Urology         Index   Blood in Urine   ________________________________________________________________________  KEY POINTS    Even a small amount of blood can make the urine look pink, red, or sometimes brown. Sometimes the blood can be seen only with a microscope. Blood in the urine should always be checked by your healthcare provider.    Your healthcare provider will recommend treatment and follow-up depending on the results of your medical history, your exam and test results.    Ask your provider how and when you will hear your test results and how to take care of yourself at home.  ________________________________________________________________________  What does blood in the urine look like?  Even a small amount of blood can make the urine look pink, red, or sometimes brown. Sometimes the blood can be seen only with a microscope.  Blood in the urine should always be checked by your healthcare provider.  What causes blood in urine?  The urinary tract includes the:    Kidneys, which make urine    Ureters, which are the tubes that carry urine from the kidneys to the  bladder    Bladder, which stores urine    Urethra, which is the tube that drains urine from the bladder  A number of things may cause bleeding in the urinary tract. Some common causes of blood in the urine are:    Kidney or bladder infection    Bladder or kidney stones    An infection of the prostate gland or epididymis (the firm tube at the back of a boy s or man s testicle)    Intense exercise    A reaction of your immune system after an infection, such as a cold    Injury to any part of the urinary tract (for example, a fall might bruise the kidney)    Kidney disease    Some diseases, such as sickle cell anemia or lupus    Some medicines, like antibiotics or medicines that help prevent blood clots    A tumor in the urinary tract  Certain kinds of foods, such as beets or blackberries, may give the urine a reddish tint. This should last only for a day or so after eating these foods. A few medicines may also turn the urine reddish. If you have started a new medicine and notice a color change in your urine, call your pharmacist to see if it s normal.  How is it diagnosed?  Your healthcare provider will ask about other symptoms and your medical history and examine you. Tests may include:    Urine tests    Blood tests    An ultrasound, which uses sound waves to show pictures of the bladder and kidneys    Intravenous pyelogram, which uses dye and X-rays to take pictures of the urinary tract    Cystoscopy, which uses a small lighted tube passed through the urethra into the bladder to look at the bladder, urethra, and prostate gland  How is it treated?  The treatment of blood in the urine depends on its cause. Your healthcare provider will recommend treatment and follow-up depending on the results of your medical history, your exam and test results.  How can I take care of myself?  Ask your provider:    How and when you will get your test results    How long it will take to recover    If there are activities you should  avoid and when you can return to your normal activities    How to take care of yourself at home    What symptoms or problems you should watch for and what to do if you have them  Make sure you know when you should come back for a checkup. Keep all appointments for provider visits or tests.  Developed by [employer].  Adult Advisor 2016.3 published by [employer].  Last modified: 2016-06-07  Last reviewed: 2014-12-31  This content is reviewed periodically and is subject to change as new health information becomes available. The information is intended to inform and educate and is not a replacement for medical evaluation, advice, diagnosis or treatment by a healthcare professional.  References   Adult Advisor 2016.3 Index    Copyright   2016 [employer], a division of [employer] All rights reserved.

## 2017-12-30 NOTE — NURSING NOTE
Patient Information     Patient Name MRN Ambrose Chavez [id number] Male 8/10/1927      Nursing Note by Neela Davey at 2017  1:30 PM     Author:  Neela Davey Service:  (none) Author Type:  (none)     Filed:  2017  1:48 PM Encounter Date:  2017 Status:  Signed     :  Neela Davey            Patient presents to clinic for fever, chills, and stomach tenderness, and increased phlegm.  Neela Davey LPN ....................  2017   1:31 PM

## 2017-12-30 NOTE — NURSING NOTE
Patient Information     Patient Name MRN Ambrose Chavez [id number] Male 8/10/1927      Nursing Note by Ching Diop at 2017  2:45 PM     Author:  Ching Diop Service:  (none) Author Type:  (none)     Filed:  2017  2:52 PM Encounter Date:  2017 Status:  Signed     :  Ching Diop            Patient here for c/o sudden onset of fever last night, up to almost 102. Went back down around bedtime and was normal earlier today. Denies any pain or feeling ill.  Ching Diop LPN..............................2017  2:48 PM

## 2017-12-30 NOTE — NURSING NOTE
Patient Information     Patient Name MRN Ambrose Chavez [id number] Male 8/10/1927      Nursing Note by Ysabel Birmingham at 2017 11:00 AM     Author:  Ysabel Birmingham Service:  (none) Author Type:  (none)     Filed:  2017 11:15 AM Encounter Date:  2017 Status:  Signed     :  Ysabel Birmingham            Patient presents to the clinic for hematuria consult.  Ysabel Birmingham LPN........................2017  10:53 AM    Review of Systems:    Weight loss:    No     Recent fever/chills:  yes   Night sweats:   No  Current skin rash:  No   Recent hair loss:  No  Heat intolerance:  No   Cold intolerance:  No  Chest pain:   No   Palpitations:   No  Shortness of breath:  No   Wheezing:   No  Constipation:    No   Diarrhea:   No   Nausea:   No   Vomiting:   No   Kidney/side pain:  No   Back pain:   No  Frequent headaches:  No   Dizziness:     No  Leg swelling:   No   Calf pain:    No    Parents, brothers or sisters with history of kidney cancer?   No  Parents, brothers or sisters with history of bladder cancer: No

## 2017-12-30 NOTE — NURSING NOTE
Patient Information     Patient Name MRN Ambrose Chavez [id number] Male 8/10/1927      Nursing Note by Neela Davey at 2017  9:30 AM     Author:  Neela Davey Service:  (none) Author Type:  (none)     Filed:  2017  9:37 AM Encounter Date:  2017 Status:  Signed     :  Neela Davey            Patient presents to clinic for F/U on fever and chills that started on Thursday.  Has not had fever since Thursday.  Has been having some double vision issues.  Neela Davey LPN ....................  2017   9:30 AM

## 2017-12-30 NOTE — NURSING NOTE
Patient Information     Patient Name MRN Sex Ambrose Ferris [id number] Male 8/10/1927      Nursing Note by Tiffanie Campos at 10/9/2017 10:15 AM     Author:  Tiffanie Campos Service:  (none) Author Type:  (none)     Filed:  10/9/2017 10:13 AM Encounter Date:  10/9/2017 Status:  Signed     :  Tiffanie Campos            Patient is here today for follow up on   Review of Systems:    Weight loss:    No     Recent fever/chills:  No   Night sweats:   No  Current skin rash:  No   Recent hair loss:  No  Heat intolerance:  No   Cold intolerance:  No  Chest pain:   No   Palpitations:   No  Shortness of breath:  No   Wheezing:   No  Constipation:    No   Diarrhea:   No   Nausea:   No   Vomiting:   No   Kidney/side pain:  No   Back pain:   No  Frequent headaches:  No   Dizziness:     No  Leg swelling:   No   Calf pain:    No    Tiffanie Campos LPN 10/9/2017 10:03 AM

## 2017-12-30 NOTE — NURSING NOTE
Patient Information     Patient Name MRN Ambrose Chavez [id number] Male 8/10/1927      Nursing Note by Tiffanie Campos at 2017 12:45 PM     Author:  Tiffanie Campos Service:  (none) Author Type:  (none)     Filed:  2017  1:01 PM Encounter Date:  2017 Status:  Signed     :  Tiffanie Campos            Patient is here for follow up   Tiffanie Campos LPN 2017 12:42 PM

## 2018-01-03 NOTE — PROGRESS NOTES
Patient Information     Patient Name MRN Sex Ambrose Ferris [id number] Male 8/10/1927      Progress Notes by Derick Brock MD at 2017  9:30 AM     Author:  Derick Brock MD Service:  (none) Author Type:  Physician     Filed:  2017 10:29 AM Encounter Date:  2017 Status:  Signed     :  Derick Brock MD (Physician)            Subjective    CC:   Chief Complaint     Patient presents with       Follow Up      myasthenia gravis       Ambrose Ni is a 89 y.o. male who presents for follow-up myasthenia gravis. Initially they scheduled this appointment because they didn't think the insurance was going to pay for Mestinon extended release. However the prior authorization was approved. He's been taking Mestinon extended release 180 mg for about 1-2 weeks. He feels like the symptoms are about the same. He has noticed that he has some weakness of the jaw muscles and neck. It seems to be worst in the morning with breakfast time about an hour after he takes his medication. The other night he developed chills associated with shaking of the hands. It was bilateral. It improved with Tylenol. It lasted for about an hour. His wife wondered if it was related to 1 alcoholic drink. He had no body aches. He did have some dysuria in the last week but that has resolved. He had one episode of loose stool. He used the MiraLAX which caused a big blowout and now his bowel movements have been more regular. He wants to know if he can return to taking the fiber supplement. 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 They're getting pressure from the children that he should stop driving regardless because he is almost 89 years old. Both he and his wife think that he is a good  and if he passes this test he would like to continue driving. He's had no double vision. No TMJ pain. No grinding of teeth. Some rhinorrhea has been present, chronic in nature.    Problem List/PMH:  reviewed in EMR, and made relevant updates today.  Medications: reviewed in EMR, and made relevant updates today.  Allergies: reviewed in EMR, and made relevant updates today.    Social Hx:  Social History        Substance Use Topics          Smoking status:   Former Smoker      Packs/day:  1.25      Years:  25.00      Types:  Cigarettes      Quit date:  1963      Smokeless tobacco:   Never Used      Alcohol use   Yes      Comment: social per pt       Social History Narrative    Retired. , lives with his wife. Children live in [place] and [place]~~    Cholesterol 220 to 240 with an LDL of 150.     Has specialty care in [place]              I reviewed social history and made relevant updates today.    Family Hx:   Family History       Problem   Relation Age of Onset     Other  Father       at 79 in his sleep       Heart Disease  Mother      MI       Hypertension  Mother      Stroke  Mother      CVA at 76       Other  Brother      CNS hemorrhage at 59       Cancer  Brother      CA of the lung       Other  Sister      Respiratory failure with pulmonary fibrosis       Other  Sister       at age 81 with dementia       Good Health  Son      Good Health  Son      Sarcoidosis  Grandchild      Thyroid Disease  No Family History      Lupus  No Family History      Rheum arthritis  No Family History          Objective    Vitals: reviewed in EMR.    Visit Vitals       /80     Pulse 72     Wt 75.3 kg (166 lb)     BMI 26 kg/m2       Gen: Pleasant male, NAD.  HEENT: MMM  Neck: Supple  Pulm: Breathing easily  Neuro: Grossly intact. Extraocular movements intact. Normal finger-to-nose.  Skin: No concerning lesions.  Psychiatric: Normal affect and insight. Does not appear anxious or depressed.  Abd: soft, no suprapubic tenderness        Assessment      ICD-10-CM    1. Myasthenia gravis (HC) G70.00    2. Chills R68.83    3. Tremor R25.1        I would be concerned to evaluate for a possible source of  infection should he return with repeat chills and shaking, consider urinary tract infection, diverticulitis, etc. It is also possible that this is a side effect from the Mestinon. Warning signs were discussed, recommend repeat evaluation if symptoms persist or worsen. Rather than confuse the picture I recommended continuing Mestinon at the same dosage for a while here. If symptoms worsen we could consider increasing the dosage versus requesting neurology consultation.      Plan     -- Expected clinical course discussed   -- Medications and their side effects discussed  Patient Instructions    -- If you develop chills again, come in for a check   -- Keep Mestinon ER stable without change   -- Daily exercise   -- OT driving test coming up   -- Follow-up in 2 months, sooner if concerns    Return in about 2 months (around 3/27/2017), or if symptoms worsen or fail to improve, for medication management.      Signed, Derick Brock MD  Internal Medicine & Pediatrics

## 2018-01-03 NOTE — TELEPHONE ENCOUNTER
Patient Information     Patient Name MRN Ambrose Chavez [id number] Male 8/10/1927      Telephone Encounter by Allison Fish at 3/1/2017  2:39 PM     Author:  Allison Fish Service:  (none) Author Type:  (none)     Filed:  3/1/2017  2:39 PM Encounter Date:  3/1/2017 Status:  Signed     :  Allison Fish            Patient states still is bothersome, coming in today.  Breonna Fish LPN ...... 3/1/2017 2:39 PM

## 2018-01-03 NOTE — PATIENT INSTRUCTIONS
Patient Information     Patient Name MRN Ambrose Chavez [id number] Male 8/10/1927      Patient Instructions by Elba Pereira RN at 2017 10:30 AM     Author:  Elba Pereira RN Service:  (none) Author Type:  NURS- Registered Nurse     Filed:  2017 10:39 AM Encounter Date:  2017 Status:  Signed     :  Elba Pereira RN (NURS- Registered Nurse)            2017 Details    Sun Mon Tue Wed Thu Fri Sat     1               2               3               4               5               6               7                 8               9               10               11               12               13               14                 15               16               17               18               19               20               21                 22               23      2 mg   See details      24      3 mg         25      2 mg         26      3 mg         27      2 mg         28      3 mg           29      3 mg         30      2 mg         31      3 mg              Date Details    This INR check               How to take your warfarin dose     To take:  2 mg Take one of the 2 mg tablets.    To take:  3 mg Take one and a half of the 2 mg tablets.           2017 Details    Sun Mon Tue Wed Thu Fri Sat        1      2 mg         2      3 mg         3      2 mg         4      3 mg           5      3 mg         6      2 mg         7               8               9               10               11                 12               13               14               15               16               17               18                 19               20               21               22               23               24               25                 26               27               28                    Date Details   No additional details    Date of next INR:  2017         How to take your warfarin dose     To take:  2 mg Take one of the 2 mg tablets.     To take:  3 mg Take one and a half of the 2 mg tablets.             Description          Decrease dose and recheck in 2 weeks.  ..............Elba Pereira RN.............  1/23/2017    10:37 AM                                Anticoagulation Summary as of 1/23/2017     INR goal 2.0-3.0    Today's INR 3.4    Next INR check 2/6/2017          Call your Anticoagulation Clinic at Dept: [phone]   if:   1. Any medications are started, stopped, or there is a change in dose.  2. You experience any bleeding that is not easily stopped or if it is recurrent.  3. You notice an increase in bruising or any bruising that does not heal.  You are scheduled for surgery, colonoscopy, dental extraction or any other procedure where you may need to stop your Coumadin (warfarin).

## 2018-01-03 NOTE — PATIENT INSTRUCTIONS
Patient Information     Patient Name MRN Ambrose Chavez [id number] Male 8/10/1927      Patient Instructions by Elba Pereira RN at 3/20/2017  9:00 AM     Author:  Elba Pereira RN Service:  (none) Author Type:  NURS- Registered Nurse     Filed:  3/20/2017  8:47 AM Encounter Date:  3/20/2017 Status:  Signed     :  Elba Pereira RN (NURS- Registered Nurse)            2017 Details    Sun Mon Tue Wed Thu Fri Sat        1               2               3               4                 5               6               7               8               9               10               11                 12               13               14               15               16               17               18                 19               20      4 mg   See details      21      3 mg         22      3 mg         23      3 mg         24      3 mg         25      3 mg           26      3 mg         27      4 mg         28      3 mg         29      3 mg         30      3 mg         31      3 mg           Date Details    This INR check               How to take your warfarin dose     To take:  3 mg Take one and a half of the 2 mg tablets.    To take:  4 mg Take two of the 2 mg tablets.           2017 Details    Sun Mon Tue Wed Thu Fri Sat           1      3 mg           2      3 mg         3      4 mg         4               5               6               7               8                 9               10               11               12               13               14               15                 16               17               18               19               20               21               22                 23               24               25               26               27               28               29                 30                      Date Details   No additional details    Date of next INR:  4/3/2017         How to take your warfarin dose     To take:  3  mg Take one and a half of the 2 mg tablets.    To take:  4 mg Take two of the 2 mg tablets.             Description          Increase dose and recheck in 2 weeks. .............Elba Pereira RN    3/20/2017  8:46 AM                                Anticoagulation Summary as of 3/20/2017     INR goal 2.0-3.0    Today's INR 1.8    Next INR check 4/3/2017          Call your Anticoagulation Clinic at Dept: [phone]   if:   1. Any medications are started, stopped, or there is a change in dose.  2. You experience any bleeding that is not easily stopped or if it is recurrent.  3. You notice an increase in bruising or any bruising that does not heal.  You are scheduled for surgery, colonoscopy, dental extraction or any other procedure where you may need to stop your Coumadin (warfarin).

## 2018-01-03 NOTE — PROGRESS NOTES
"Patient Information     Patient Name MRN Ambrose Chavez [id number] Male 8/10/1927      Progress Notes by Derick Brock MD at 2017  8:45 AM     Author:  Derick Brock MD Service:  (none) Author Type:  Physician     Filed:  2017 10:15 AM Encounter Date:  2017 Status:  Signed     :  Derick Brock MD (Physician)            Subjective    Ambrose Ni is a 89 y.o. male who presents for medication management. He thinks his myasthenia gravis symptoms are slowly improving. When he gets tired his eyes are more droopy currently the left more than the right. The double vision has resolved. His voice is weekend when he started. He has a lot of phlegm but he never chokes or coughs with drinking. Since his episode of diverticulitis he's been drinking Metamucil. Last several days he's only had small rabbit pellets or \"raisins\" that come out. Sometimes he has to \"grease a finger\" to help them come out.    Allergies: reviewed in EMR  Medications: reviewed in EMR  Problem List/PMH: reviewed in EMR    Social Hx:  Social History        Substance Use Topics          Smoking status:   Former Smoker      Packs/day:  1.25      Years:  25.00      Types:  Cigarettes      Quit date:  1963      Smokeless tobacco:   Never Used      Alcohol use   Yes      Comment: social per pt       Social History Narrative    Retired. , lives with his wife. Children live in [facility]~~    Cholesterol 220 to 240 with an LDL of 150.     Has specialty care in [facility]              I reviewed social history and made relevant updates today.    Family Hx:   Family History       Problem   Relation Age of Onset     Other  Father       at 79 in his sleep       Heart Disease  Mother      MI       Hypertension  Mother      Stroke  Mother      CVA at 76       Other  Brother      CNS hemorrhage at 59       Cancer  Brother      CA of the lung       Other  Sister      Respiratory failure with pulmonary " "fibrosis       Other  Sister       at age 81 with dementia       Good Health  Son      Good Health  Son      Sarcoidosis  Grandchild      Thyroid Disease  No Family History      Lupus  No Family History      Rheum arthritis  No Family History          Objective    Vitals: reviewed in EMR.    Visit Vitals       /64     Pulse 66     Ht 1.702 m (5' 7\")     Wt 74.4 kg (164 lb)     BMI 25.69 kg/m2       Gen: Pleasant male, NAD.  HEENT: MMM  Neck: Supple  Pulm: Breathing easily  Neuro: Grossly intact  Skin: No concerning lesions.  Psychiatric: Normal affect and insight. Does not appear anxious or depressed.        Assessment      ICD-10-CM    1. Myasthenia gravis (HC) G70.00 pyridostigmine (MESTINON TIMESPAN) 180 mg Sustained-Release tablet      OT PRE DRIVING SCREEN      AMB CONSULT TO OCCUPATIONAL THERAPY   2. Anticoagulation monitoring, INR range 2-3 [V58.61] Z79.01 DISCONTINUED: warfarin (COUMADIN) 2 mg tablet   3. Atrial flutter, unspecified type (HC) I48.92 DISCONTINUED: warfarin (COUMADIN) 2 mg tablet   4. Atrial flutter, unspecified type I48.92 DISCONTINUED: warfarin (COUMADIN) 2 mg tablet   5. Diverticulosis of large intestine without hemorrhage K57.30    6. Constipation, unspecified constipation type K59.00        Overall he seems to be improving to me. There is some areas for improvement particularly when he is getting more tired. Increasing the dosage would likely be helpful however we discussed options and decided to switch to the extended release form at this time. If tolerated but symptoms still persist would consider increasing the dosage. We also had a discussion today with regards for if and when to consult neurology. I would consider doing so if symptoms are not well controlled with Mestinon or a significant exacerbation were to develop. This would be to consider autoimmune treatment or possible plasmapheresis particularly within the acute exacerbation requiring hospitalization.      Plan     " -- Expected clinical course discussed   -- Medications and their side effects discussed  Patient Instructions    -- Switch to Mestinon  mg daily   -- Follow-up if not improving     -- Start polyethylene glycol (MiraLax) 2 capful two times a day for a few days, then cut back dose.  Most likely you'll be using 1 capful daily after a few days   -- Progression will be small hard pellet stool, hard log stool, soft stool.  Expect some liquid stool as well.  Goal soft formed daily stool (applesauce/peanutbutter consistency)   -- After 2-3 days, if you still feel constipated the next step up is to add Senna 1-2 tablets twice a day   -- Use MiraLax daily for a month to allow colon to regain strength   -- Drink more water   -- Eat more fruits and vegetables   -- No fiber supplements until at least 1 month out.  Fiber containing foods okay.   -- MiraLax is safe to use indefinitely   -- After 1 month, consider switching from MiraLax to daily fiber supplement (eg Citrucel 1 tbsp daily).      Return in about 3 months (around 4/16/2017), or if symptoms worsen or fail to improve.      Signed, Derick Brock MD  Internal Medicine [facility] Pediatrics

## 2018-01-03 NOTE — NURSING NOTE
Patient Information     Patient Name MRN Ambrose Chavez [id number] Male 8/10/1927      Nursing Note by Neela Davey at 2017  8:45 AM     Author:  Neela Davey Service:  (none) Author Type:  (none)     Filed:  2017  9:11 AM Encounter Date:  2017 Status:  Signed     :  Neela Davey            Patient presents to clinic for F/U on Myasthenia Gravis and for diverticulitis.  Neela Davey LPN ....................  2017   8:51 AM

## 2018-01-03 NOTE — PATIENT INSTRUCTIONS
Patient Information     Patient Name MRAmbrose Soliz [id number] Male 8/10/1927      Patient Instructions by Derick Brock MD at 3/1/2017  3:00 PM     Author:  Derick Brock MD Service:  (none) Author Type:  Physician     Filed:  3/1/2017  3:59 PM Encounter Date:  3/1/2017 Status:  Signed     :  Derick Brock MD (Physician)             -- No change to medication now   -- If muscle twitching persists, return for recheck. We would either reduce the dose of Mestinon or refer to Neurology   -- Avoid driving in bad weather, when tired, long road trips

## 2018-01-03 NOTE — PATIENT INSTRUCTIONS
Patient Information     Patient Name MRN Ambrose Chavez [id number] Male 8/10/1927      Patient Instructions by Elba Pereira RN at 2017  9:45 AM     Author:  Elba Pereira RN Service:  (none) Author Type:  NURS- Registered Nurse     Filed:  2017  9:40 AM Encounter Date:  2017 Status:  Signed     :  Elba Pereira RN (NURS- Registered Nurse)            2017 Details    Sun Mon Tue Wed Thu Fri Sat        1               2               3               4                 5               6      3 mg   See details      7      3 mg         8      3 mg         9      3 mg         10      2 mg         11      3 mg           12      3 mg         13      3 mg         14      3 mg         15      3 mg         16      3 mg         17      2 mg         18      3 mg           19      3 mg         20      3 mg         21      3 mg         22      3 mg         23      3 mg         24      2 mg         25      3 mg           26      3 mg         27      3 mg         28      3 mg              Date Details    This INR check               How to take your warfarin dose     To take:  2 mg Take one of the 2 mg tablets.    To take:  3 mg Take one and a half of the 2 mg tablets.           2017 Details    Sun Mon Tue Wed Thu Fri Sat        1      3 mg         2      3 mg         3      2 mg         4      3 mg           5      3 mg         6      3 mg         7               8               9               10               11                 12               13               14               15               16               17               18                 19               20               21               22               23               24               25                 26               27               28               29               30               31                 Date Details   No additional details    Date of next INR:  3/6/2017         How to take your warfarin dose      To take:  2 mg Take one of the 2 mg tablets.    To take:  3 mg Take one and a half of the 2 mg tablets.             Description          Increase dose and recheck in 2 weeks. .............Elba Pereira RN.......... 2/6/2017  9:39 AM                                  Anticoagulation Summary as of 2/6/2017     INR goal 2.0-3.0    Today's INR 1.4    Next INR check 3/6/2017          Call your Anticoagulation Clinic at Dept: [phone]   if:   1. Any medications are started, stopped, or there is a change in dose.  2. You experience any bleeding that is not easily stopped or if it is recurrent.  3. You notice an increase in bruising or any bruising that does not heal.  You are scheduled for surgery, colonoscopy, dental extraction or any other procedure where you may need to stop your Coumadin (warfarin).

## 2018-01-03 NOTE — INITIAL ASSESSMENTS
Patient Information     Patient Name MRN Ambrose Chavez [id number] Male 8/10/1927      Initial Assessments by Tiffanie Shaver OT at 2017 12:51 PM     Author:  Tiffanie Shaver OT Service:  (none) Author Type:  OT- Occupational Therapist     Filed:  2017  9:30 AM Date of Service:  2017 12:51 PM Status:  Signed     :  Tiffanie Shaver OT (OT- Occupational Therapist)            Occupational Therapy Community Driving Assessment  Date of Report:  2017  Patient Name: Ambrose Ni  Referring provider:   Dr. Brock  Diagnosis:  Myasthenia Gravis  Treatment diagnosis: community mobility assessment      Subjective: Ambrose is a 89 year old male who was recently diagnosed with Myasthenia gravis. Ambrose states this had affected his eyes.  He was having to close his right eye to see clearly through the left. His left eye had ptosis and he had to hold his eye open.  Since then his eye sight has returned to normal. Ambrose states he has not driven since early November. He is the one that is requesting the driving assessment as he wants to make certain that he is able to safely operate a motor vehicle.  He lives in his own home with his wife. He is independent in all areas of self cares. He wears bilateral hearing aids and is able to hear a at conversational level with out difficulty.    Patient Specific Functional and Pain Scales (PSFS):    Clinician Instructions: Complete after the history and before the exam.    Initial Assessment: We want to know what 3 activities in your life you are unable to perform, or are having the most difficulty performing, as a result of your chief problem. Please list and score at least 3 activities that you are unable to perform, or having the most difficulty performing, because of your chief problem.   Patient Specific Activity Scoring Scheme (score one number for each activity):   Activity Score (0-10)  0= Unable to perform activity  10= Able to perform activity  at same level as before injury or problem   1. Vision for driving 9/10   2. Cognition for driving 9/10   3. Reaction time for driving 5/10   4. Physical mobility 8/10   5. coordination 8/10   Totals:  39/50 = 78 % ability which relates to 22% impairment    Patient verbally states that they understand that the information they have provided above is current and complete to the best of their knowledge.    Patient Specific Functional Scale Modifier Scale Conversion: (patient's modifier that correlates with pt's score on PSFS): 8-CJ (20% Impaired).    G codes and Modifier taken from patient completing the PSFS:   Initial Primary G Code and Modifier:    Per the Patient's intake and/or assessment the Primary G Code is: Other PT/OT Primary .   The Patient's Impairment, Limitation or Restriction Modifier would be best described as: CJ - 20% - 40% Impairment.   Goal Primary G Code and Modifier:    The Patient's G Code Goal would be: Other PT/OT Primary    The Patient's Impairment, Limitation or Restriction Modifier goal would be best described as: CJ - 20% - 40% Impairment.   Discharge Primary G Code and Modifier:    The Patient's status upon Discharge is Other PT/OT Primary    The Patient's Impairment, Limitation or Restriction Modifier would be best described as CJ - 20% - 40% Impairment.     Pertinent Medical History:  Seizure History?  No     Past Medical History      Diagnosis   Date     Aortic valve insufficiency       Congestion of nasal sinus       With chronic cough      Elevated cholesterol       220-240,       History of acute renal failure       Sulfa induced, resolved.      History of tobacco abuse       Past history of 25 years      RECTAL BLEEDING  9/21/2011     SYNCOPE, HX OF  11/2/2011   Myasthenia Gravis      HYPERTENSION     DISC DISEASE, LUMBAR     DIVERTICULOSIS, COLON     HEPATITIS     HYPERLIPIDEMIA     AORTIC INSUFFICIENCY     BPH (benign prostatic hyperplasia)     STASIS  DERMATITIS, CHRONIC     DUPUYTREN'S CONTRACTURE, BILATERAL     ATRIAL FLUTTER, CHRONIC     Cataracts, both eyes     Pruritus     Benign prostatic hypertrophy without lower urinary tract symptoms (LUTS)      Warfarin anticoagulation     Post herpetic neuralgia - right flank     Cardiac pacemaker     Anticoagulation monitoring, INR range 2-3 [V58.61]    Myasthenia gravis (HC)    Panlobular emphysema (HC)     Hiatal hernia     Diverticulitis of large intestine     Diverticulosis of sigmoid colon       Activities of Daily Living:  He plays bridge with other couples. He had skilled PT in the pool for about 1 month.       Current license?  Yes  Current  expires: 8/10/2020    Driving prior to current diagnosis/onset?  Yes    Restrictions: corrective lens     Typically City and Rural Driving. 2x/ year he drives his wife to [place].  He is the primary . He reports no recent car accidents. He drives mostly in town for errands and meeting with friends.     Upper Body Strength and ROM (as it pertains to driving):  Upper Extremity:     Left:    Strength:  Within Normal Limits                 AROM:    Within Functional Limits (WFL)          Right:  Strength:  Within Functional Limits                 AROM:    Within Normal Limits (WNL)     Neck AROM (to check blind spots):                 AROM:    Within Functional Limits (WFL)    Gross Motor Skills (to manage steering wheel):  Within Functional Limits (WFL)    Coordination:  Within Functional Limits (WFL)    Sensation:  Intact    Proprioception:  Intact     Strength:  Left:    Intact,   47 Pounds                              Right:  Intact,   54 Pounds     Pinch Strength, as to Hold a Key (lateral pinch):                              Left:    Intact,   14 Pounds                              Right:  Intact,   17 Pounds    Lower body Strength and ROM (as it pertains to driving):  *see PT documentation for complete     Left Leg:    Intact     Right Leg:   Intact    Mobility Assistance Required:  Independent Ambulator    Vision:     Glasses: for reading?  Yes  for distance?  Yes patient had cataract surgery 4 years ago.          Saccades:  (a rapid change of fixation from one point in visual field to another):  Intact       Pursuits:  (the continued fixation on a moving target within the central field of view):  Intact       Fields: Peripheral Vision ([place] [facility] standard 105 degrees or better):  Yes       Indication of Neglect?  No       Double Vision Reported?  No    Visual-Perceptual and Cognitive Evaluations:     Three Traffic Safety Questions:  3/3          Describe:  written in sentence format in cursive.     Symbol Digit Modalities Test:     Manner Test was Given:  Written     Client's Score (correlation with impaired performance on road test for scores less         than = 25 correct in 90 seconds):  Patient score:  41          Nova Unstructured Array:     Scan Pattern:  Organized          Describe:  Left to right        Number of Errors:  2     > 8 errors is concern for areas of neglect, visual inattention, or/and low visual discrimination        Patient time: 3:05      > 2 minutes is concern for slow processing      The [employer] Mental Status Examination (UMS) is a brief oral/written exam given to people who are suspected to have dementia or Alzheimer s Disease. The SLUMS is a 30-point, 11 question screening questionnaire that tests orientation, memory, attention, and executive function, with items such as animal naming, digit span, figure recognition, clock drawing and size differentiation. The maximum score is 30 points, with the point values for correct answers written on the exam for easy scoring. Cut-off scores for dementia or mild neurocognitive impairment are based on the education level of the patient (high school and above or less than high school). The exam serves as a tool to indicate whether further testing to diagnose dementia  should be administered The SLUMS is widely used by professionals, and it does a better job of detecting Alzheimer s in persons that have a higher education level then the MMSE.     Patient s level of education college degree in Law             Level of alertness follows multiple step directions. oriented to person and place.    Score 29/30 Indicating: normal cognitive level.   Scoring  High school education                               Less than high school education  37-30      Normal    25-30  21-26     Mild neurocogonitive disorder  20-24  1-20      Dementia   1-19       Dynavision:        Mode A Score:      Trial 1       Trial 2         Score: Borderline 45-51        Average:  Borderline 45-51       Patient score: 47, 50       Mode B Score:      Trial 1       Score:  Safe 42+           Patient score: 21        One (1) minute apparatus based with one digit Mode A (divided attention):      Trial 1        Score:  Safe 35+         Patient score: 38       Number of missed digits:  3   3+ missed digits = unsafe                     4 minutes endurance Score:       Trial 1          Score:  below 175        Patient score: 170     200+ = safe;  175-199 = borderline,   < 175 = unsafe      Interactive Driving Simulation Experience:  Ambrose was able to simulate driving through a city and on to the freeway with accurate foot movement from gas pedal to brake. He was able to apply appropriate amount of pressure to the pedals.       Patient's Current Plan (return to driving):  Ambrose plans on continuing to drive.     Summary and Recommendations:  Cognitive testing shows Ambrose is able to remember current information such as directions, with out difficulty. He is able to problem solve and attend to more than one thing at a time. His vision is good. He does not have a filed cut or difficulty with visual discrimination or inattention.  He is able to physically manage a motor vehicle. His reaction and response times on the dynaviosn were  lower than indicated for safe driving, however he is aware of this and is able to make accomodation with his driving. He was able to verbally explain how to compensate for his slower response time  By allowing more space between cars, initiating stopping at sings and lights earlier.     Outcome:  (Do not drive until your Physician specifically recommends driving.)     Recommendation:  Although Ambrose demonstrates response times that are slower than those indicated for safe driving., he is cognitively intact to be able to compensate for this.  He is a cautious  and has taken the [facility] defensive driving class several times and plans on taking it again. Ambrose believes he is still safe operate a motor vehicle. He is aware of his slower response times and is able to compensate.     An On the Road Assessment is always recommended as the best predictor of fitness to drive.  This screen is intended to communicate physical and/or cognitive performance results that the physician may consider when making recommendations in the ADL area of driving.    Goals:     1. Patient will participate in dynavision screening to determine response times for driving. Goal met  2. Patient will participate in cognitive testing to determine ability to drive safely. Goal met  3. Patient will participate in physical testing to determine physical ability to manage a motor vehicle safely. Goal met    Copy given to patient / family member:  No scares and results were discussed with Ambrose.     The results of this pre-driving screen and recommendations will be sent to referring physician and medical records.      Tiffanie Shaver OTR/L  Occupational Therapist

## 2018-01-03 NOTE — PROGRESS NOTES
Patient Information     Patient Name MRN Ambrose Chavez [id number] Male 8/10/1927      Progress Notes by Derick Brock MD at 3/1/2017  3:00 PM     Author:  Derick Brock MD Service:  (none) Author Type:  Physician     Filed:  3/1/2017  5:09 PM Encounter Date:  3/1/2017 Status:  Signed     :  Derick Brock MD (Physician)            Subjective  Ambrose Ni is a 89 y.o. male who presents for body twitching. He feels like he's developed a fine twitching of muscles throughout the body. It is most noticeable in the hands and forearms. It does not hurt. It doesn't really bother other than he is worried it may worsen. The only recent change to his medication was switching from short to long-acting Mestinon. His wife is worried it may be a sign that his myasthenia gravis is worsening. He's had no other new medications initiated. He thinks he did pretty well and is driving evaluation and would like to discuss that as well. His myasthenia gravis has been well controlled and really only gets eyelid and muscle weakness symptoms if he's had a long active day.    Problem List/PMH: reviewed in EMR, and made relevant updates today.  Medications: reviewed in EMR, and made relevant updates today.  Allergies: reviewed in EMR, and made relevant updates today.    Social Hx:  Social History        Substance Use Topics          Smoking status:   Former Smoker      Packs/day:  1.25      Years:  25.00      Types:  Cigarettes      Quit date:  1963      Smokeless tobacco:   Never Used      Alcohol use   Yes      Comment: social per pt       Social History Narrative    Retired. , lives with his wife. Children live in [facility]    Cholesterol 220 to 240 with an LDL of 150.     Has specialty care in [facility]              I reviewed social history and made relevant updates today.    Family Hx:   Family History       Problem   Relation Age of Onset     Other  Father       at 79 in his sleep        Heart Disease  Mother      MI       Hypertension  Mother      Stroke  Mother      CVA at 76       Other  Brother      CNS hemorrhage at 59       Cancer  Brother      CA of the lung       Other  Sister      Respiratory failure with pulmonary fibrosis       Other  Sister       at age 81 with dementia       Good Health  Son      Good Health  Son      Sarcoidosis  Grandchild      Thyroid Disease  No Family History      Lupus  No Family History      Rheum arthritis  No Family History        Objective  Vitals: reviewed in EMR.    Visit Vitals       /90     Pulse 72     Wt 76.9 kg (169 lb 9.6 oz)     BMI 26.56 kg/m2       Gen: Pleasant male, NAD.  HEENT: MMM  Neck: Supple  Pulm: Breathing easily  Neuro: Grossly intact  Skin: No concerning lesions.  Psychiatric: Normal affect and insight. Does not appear anxious or depressed.  MSK: Fine fasciculations are present    Telephone encounter and driving assessment from occupational therapy personally reviewed with the patient today.    Assessment    ICD-10-CM    1. Myasthenia gravis (HC) G70.00    2. Muscle twitching R25.3        I believe the fasciculations are secondary to nicotinic side effects from Mestinon. We discussed options including tolerating the side effects with continued dose, reduce the dose and see if symptoms of myasthenia gravis worsen, expert consultation. I don't believe this is a sign that worsening symptoms are approaching.    Plan   -- Expected clinical course discussed   -- Medications and their side effects discussed  Patient Instructions    -- No change to medication now   -- If muscle twitching persists, return for recheck. We would either reduce the dose of Mestinon or refer to Neurology   -- Avoid driving in bad weather, when tired, long road trips      Signed, Derick Brock MD  Internal Medicine & Pediatrics

## 2018-01-03 NOTE — PATIENT INSTRUCTIONS
Patient Information     Patient Name MRN Ambrose Chavez [id number] Male 8/10/1927      Patient Instructions by Derick Brock MD at 2017  8:45 AM     Author:  Derick Brock MD  Service:  (none) Author Type:  Physician     Filed:  2017  9:36 AM  Encounter Date:  2017 Status:  Addendum     :  Derick Brock MD (Physician)        Related Notes: Original Note by Derick Brock MD (Physician) filed at 2017  9:23 AM             -- Switch to Mestinon  mg daily   -- Follow-up if not improving     -- Start polyethylene glycol (MiraLax) 2 capful two times a day for a few days, then cut back dose.  Most likely you'll be using 1 capful daily after a few days   -- Progression will be small hard pellet stool, hard log stool, soft stool.  Expect some liquid stool as well.  Goal soft formed daily stool (applesauce/peanutbutter consistency)   -- After 2-3 days, if you still feel constipated the next step up is to add Senna 1-2 tablets twice a day   -- Use MiraLax daily for a month to allow colon to regain strength   -- Drink more water   -- Eat more fruits and vegetables   -- No fiber supplements until at least 1 month out.  Fiber containing foods okay.   -- MiraLax is safe to use indefinitely   -- After 1 month, consider switching from MiraLax to daily fiber supplement (eg Citrucel 1 tbsp daily).

## 2018-01-03 NOTE — TELEPHONE ENCOUNTER
Patient Information     Patient Name MRN Ambrose Chavez [id number] Male 8/10/1927      Telephone Encounter by Derick Brock MD at 3/1/2017  1:46 PM     Author:  Derick Brock MD Service:  (none) Author Type:  Physician     Filed:  3/1/2017  1:46 PM Encounter Date:  3/1/2017 Status:  Signed     :  Derick Brock MD (Physician)            If the twitching has resolved, okay to wait until Monday.  If persists, okay to doublebook this afternoon for a quick look.    Signed, Derick Brock MD  Internal Medicine & Pediatrics

## 2018-01-03 NOTE — NURSING NOTE
Patient Information     Patient Name MRN Ambrose Chavez [id number] Male 8/10/1927      Nursing Note by Neela Davey at 2017  9:30 AM     Author:  Neela Davey Service:  (none) Author Type:  (none)     Filed:  2017  9:43 AM Encounter Date:  2017 Status:  Signed     :  Neela Davey            Patient presents to clinic for F/U on Myasthenia Gravis.  Nelea Davey LPN ....................  2017   9:36 AM

## 2018-01-03 NOTE — TELEPHONE ENCOUNTER
Patient Information     Patient Name MRN Ambrose Chavez [id number] Male 8/10/1927      Telephone Encounter by Derick Brock MD at 2017 10:26 AM     Author:  Derick Brock MD Service:  (none) Author Type:  Physician     Filed:  2017 10:26 AM Encounter Date:  2017 Status:  Signed     :  Derick Brock MD (Physician)            Noted. Plan to discuss in the office.    Signed, Derick Brock MD  Internal Medicine & Pediatrics

## 2018-01-03 NOTE — PATIENT INSTRUCTIONS
Patient Information     Patient Name MRN Ambrose Chavez [id number] Male 8/10/1927      Patient Instructions by Elba Pereira RN at 3/6/2017  9:45 AM     Author:  Elba Pereira RN Service:  (none) Author Type:  NURS- Registered Nurse     Filed:  3/6/2017  9:27 AM Encounter Date:  3/6/2017 Status:  Signed     :  Elba Pereira RN (NURS- Registered Nurse)            2017 Details    Sun Mon e Wed Thu Fri Sat        1               2               3               4                 5               6      3 mg   See details      7      3 mg         8    3 mg         9      3 mg         10      3 mg         11      3 mg           12      3 mg         13      3 mg         14      3 mg         15      3 mg         16      3 mg         17      3 mg         18      3 mg           19      3 mg         20      3 mg         21               22               23               24               25                 26               27               28               29               30               31                 Date Details    This INR check       Date of next INR:  3/20/2017         How to take your warfarin dose     To take:  3 mg Take one and a half of the 2 mg tablets.             Description          Increase dose and recheck in 2 weeks. .............Elba Pereira RN    3/6/2017  9:26 AM                              Anticoagulation Summary as of 3/6/2017     INR goal 2.0-3.0    Today's INR 1.5    Next INR check 3/20/2017          Call your Anticoagulation Clinic at Dept: [phone]   if:   1. Any medications are started, stopped, or there is a change in dose.  2. You experience any bleeding that is not easily stopped or if it is recurrent.  3. You notice an increase in bruising or any bruising that does not heal.  You are scheduled for surgery, colonoscopy, dental extraction or any other procedure where you may need to stop your Coumadin (warfarin).

## 2018-01-03 NOTE — PATIENT INSTRUCTIONS
Patient Information     Patient Name MRAmbrose Soliz [id number] Male 8/10/1927      Patient Instructions by Derick Brock MD at 2017  9:30 AM     Author:  Derick Brock MD  Service:  (none) Author Type:  Physician     Filed:  2017  9:59 AM  Encounter Date:  2017 Status:  Addendum     :  Derick Brock MD (Physician)        Related Notes: Original Note by Derick Brock MD (Physician) filed at 2017  9:57 AM             -- If you develop chills again, come in for a check   -- Keep Mestinon ER stable without change   -- Daily exercise   -- OT driving test coming up   -- Follow-up in 2 months, sooner if concerns

## 2018-01-03 NOTE — TELEPHONE ENCOUNTER
Patient Information     Patient Name MRN Ambrose Chavez [id number] Male 8/10/1927      Telephone Encounter by Derick Brock MD at 2017 10:25 AM     Author:  Derick Brock MD Service:  (none) Author Type:  Physician     Filed:  2017 10:25 AM Encounter Date:  2017 Status:  Signed     :  Derick Brock MD (Physician)            ----- Message from Tiffanie Shaver OT sent at 2017  9:30 AM CST -----  Regarding: Community Mobility Assessment  Dear Ambrose Vaughan participated in the community mobility assessment on . You can find the results in under OT evaluation for that date. Overall, he did well in all areas with the exception of the dynavision. He shows borderline to below the level indicated for safe driving. This indicates his processing speed and reaction times are slower than what is consider safe for managing a motor vehicle. He is cognitively able to verbalize ways to compensate for this. I think he has been driving with this for quite a while. After the assessment, he felt like he was still safe to drive. He indicated that he will continue to drive until he thinks he is not safe to drive.  I think for a person who is 89 years old he is doing well and did not see any indications that he would be unsafe operating a motor vehicle.     As always, feel free to contact me with any questions or concerns. Ext. [phone]      Tiffanie Shaver OTR/L  Occupational Therapist

## 2018-01-03 NOTE — PATIENT INSTRUCTIONS
Patient Information     Patient Name MRN Ambrose Chavez [id number] Male 8/10/1927      Patient Instructions by Imelda Koo RN at 2017 10:15 AM     Author:  Imelda Koo RN  Service:  (none) Author Type:  NURS- Registered Nurse     Filed:  2017 10:03 AM  Encounter Date:  2017 Status:  Addendum     :  Imelda Koo RN (NURS- Registered Nurse)        Related Notes: Original Note by Imelda Koo RN (NURS- Registered Nurse) filed at 2017 10:01 AM            2017 Details    Sun Mon Tue Wed Thu Fri Sat     1               2               3               4               5               6               7                 8               9               10               11               12               13               14                 15               16      Hold   See details      17      2 mg         18      3 mg         19      3 mg         20      3 mg         21      3 mg           22      3 mg         23      3 mg         24               25               26               27               28                 29               30               31                    Date Details    This INR check       Date of next INR:  2017         How to take your warfarin dose     To take:  2 mg Take one of the 2 mg tablets.    To take:  3 mg Take one and a half of the 2 mg tablets.    Hold Do not take your warfarin dose. The details table to the right may include additional information.                  Description          Hold today's dose, then decrease dose and recheck INR in one week.  IMELDA KOO RN ....................  2017   10:01 AM                                    Anticoagulation Summary as of 2017     INR goal 2.0-3.0    Today's INR 4.2!    Next INR check 2017          Call your Anticoagulation Clinic at Dept: [phone]   if:   1. Any medications are started, stopped, or there is a change in dose.  2. You experience any  bleeding that is not easily stopped or if it is recurrent.  3. You notice an increase in bruising or any bruising that does not heal.  You are scheduled for surgery, colonoscopy, dental extraction or any other procedure where you may need to stop your Coumadin (warfarin).

## 2018-01-03 NOTE — TELEPHONE ENCOUNTER
Patient Information     Patient Name MRN Ambrose Chavez [id number] Male 8/10/1927      Telephone Encounter by Neela Davey at 3/1/2017  1:16 PM     Author:  Neela Davey Service:  (none) Author Type:  (none)     Filed:  3/1/2017  1:21 PM Encounter Date:  3/1/2017 Status:  Signed     :  Neela Davey            Spoke to wife Adri and she states that pt has had full body twitches twice now and this episode is lasting over 3 hours today.  Wife states that he has no discomfort with this.  They would like to know what they should do if this does not go away.  They will come in on Monday if you would like them to.  appt held for them at 11:15 am.  Neela Davey LPN ....................  3/1/2017   1:21 PM

## 2018-01-04 NOTE — PATIENT INSTRUCTIONS
Patient Information     Patient Name MRN Ambrose Chavez [id number] Male 8/10/1927      Patient Instructions by Derick Brock MD at 2017  8:15 AM     Author:  Derick Brock MD  Service:  (none) Author Type:  Physician     Filed:  2017  8:48 AM  Encounter Date:  2017 Status:  Addendum     :  Derick Brock MD (Physician)        Related Notes: Original Note by Derick Brock MD (Physician) filed at 2017  8:47 AM             -- Start a fiber supplement (eg Citrucel 1 tbsp daily in a tall glass of water)   -- Eat yogurt   -- Try a probiotic (eg Florastor)     -- Switch long acting Mestinon 180 mg to bedtime   -- Start 1/2 tablet of short acting Mestinon (30 mg) in morning, and before afternoon nap   -- Follow-up in 1 month

## 2018-01-04 NOTE — NURSING NOTE
Patient Information     Patient Name MRN Ambrose Chavez [id number] Male 8/10/1927      Nursing Note by Neela Davey at 2017  8:15 AM     Author:  Neela Davey Service:  (none) Author Type:  (none)     Filed:  2017  8:29 AM Encounter Date:  2017 Status:  Signed     :  Neela Davey            Patient presents to clinic for F/U on myasthenia gravis.  Neela Davey LPN ....................  2017   8:19 AM

## 2018-01-04 NOTE — PROGRESS NOTES
Patient Information     Patient Name MRN Ambrose Chavez [id number] Male 8/10/1927      Progress Notes by Derick Brock MD at 2017  8:15 AM     Author:  Derick Brock MD Service:  (none) Author Type:  Physician     Filed:  2017  6:20 PM Encounter Date:  2017 Status:  Signed     :  Derick Brock MD (Physician)            Subjective  Ambrose Ni is a 89 y.o. male who presents for follow-up myasthenia gravis. They have several concerns and questions. He feels like the Mestinon is working but it fluctuates. Sometimes it helps sometimes it's not helping as much. They noticed he has more limited leg at suppertime. He takes a long nap in the afternoon of 3 hours. He's had some stomach upset with some looser stools. The twitching comes and goes. He describes his bowel movements as bits and pieces with mucus. He does have some slight abdominal discomfort.    Problem List/PMH: reviewed in EMR, and made relevant updates today.  Medications: reviewed in EMR, and made relevant updates today.  Allergies: reviewed in EMR, and made relevant updates today.    Social Hx:  Social History        Substance Use Topics          Smoking status:   Former Smoker      Packs/day:  1.25      Years:  25.00      Types:  Cigarettes      Quit date:  1963      Smokeless tobacco:   Never Used      Alcohol use   Yes      Comment: social per pt       Social History Narrative    Retired. , lives with his wife. Children live in [facility]    Cholesterol 220 to 240 with an LDL of 150.     Has specialty care in [facility]              I reviewed social history and made relevant updates today.    Family Hx:   Family History       Problem   Relation Age of Onset     Other  Father       at 79 in his sleep       Heart Disease  Mother      MI       Hypertension  Mother      Stroke  Mother      CVA at 76       Other  Brother      CNS hemorrhage at 59       Cancer  Brother      CA of the lung        "Other  Sister      Respiratory failure with pulmonary fibrosis       Other  Sister       at age 81 with dementia       Good Health  Son      Good Health  Son      Sarcoidosis  Grandchild      Thyroid Disease  No Family History      Lupus  No Family History      Rheum arthritis  No Family History        Objective  Vitals: reviewed in EMR.  /80  Pulse 72  Ht 1.702 m (5' 7\")  Wt 78.4 kg (172 lb 12.8 oz)  BMI 27.06 kg/m2    Gen: Pleasant male, NAD.  HEENT: MMM  Neck: Supple  Pulm: Breathing easily  Neuro: Grossly intact  Skin: No concerning lesions.  Psychiatric: Normal affect and insight. Does not appear anxious or depressed    Assessment    ICD-10-CM    1. Myasthenia gravis (HC) G70.00 pyridostigmine (MESTINON TIMESPAN) 180 mg Sustained-Release tablet      pyridostigmine (MESTINON) 60 mg tablet       Plan   -- Expected clinical course discussed   -- Medications and their side effects discussed  Patient Instructions    -- Start a fiber supplement (eg Citrucel 1 tbsp daily in a tall glass of water)   -- Eat yogurt   -- Try a probiotic (eg Florastor)     -- Switch long acting Mestinon 180 mg to bedtime   -- Start 1/2 tablet of short acting Mestinon (30 mg) in morning, and before afternoon nap   -- Follow-up in 1 month    Return in about 4 weeks (around 2017) for recheck mestinon adjustment.    Signed, Derick Brock MD  [facility]            "

## 2018-01-04 NOTE — PATIENT INSTRUCTIONS
Patient Information     Patient Name MRN Ambrose Chavez [id number] Male 8/10/1927      Patient Instructions by Elba Pereira RN at 2017  9:15 AM     Author:  Elba Pereira RN Service:  (none) Author Type:  NURS- Registered Nurse     Filed:  2017  9:08 AM Encounter Date:  2017 Status:  Signed     :  Elba Pereira RN (NURS- Registered Nurse)            May 2017 Details    Sun  Fri Sat      1               2               3               4               5               6                 7               8      Hold   See details      9      3 mg         10      3 mg         11      3 mg         12      3 mg         13      3 mg           14      3 mg         15      4 mg         16               17               18               19               20                 21               22               23               24               25               26               27                 28               29               30               31                   Date Details    This INR check       Date of next INR:  5/15/2017         How to take your warfarin dose     To take:  3 mg Take one and a half of the 2 mg tablets.    To take:  4 mg Take two of the 2 mg tablets.    Hold Do not take your warfarin dose. The details table to the right may include additional information.                  Description          Hold today then take 3 mg daily and recheck in 1 week. Elba Pereira RN    2017  9:07 AM                            Anticoagulation Summary as of 2017     INR goal 2.0-3.0    Today's INR 4.6    Next INR check 5/15/2017          Call your Anticoagulation Clinic at Dept: [phone]   if:   1. Any medications are started, stopped, or there is a change in dose.  2. You experience any bleeding that is not easily stopped or if it is recurrent.  3. You notice an increase in bruising or any bruising that does not heal.  4. You are scheduled for surgery,  colonoscopy, dental extraction or any other procedure where you may need to stop your Coumadin (warfarin).

## 2018-01-04 NOTE — PATIENT INSTRUCTIONS
Patient Information     Patient Name MRN Ambrose Chavez [id number] Male 8/10/1927      Patient Instructions by Imelda Ratliff RN at 2017  9:00 AM     Author:  Imelda Ratliff RN Service:  (none) Author Type:  NURS- Registered Nurse     Filed:  2017  8:56 AM Encounter Date:  2017 Status:  Signed     :  Imelda Ratliff RN (NURS- Registered Nurse)            2017 Details    Sun Mon Tue Wed Thu Fri Sat           1                 2               3               4               5               6               7               8                 9               10               11               12               13               14               15                 16               17      4 mg   See details      18      3 mg         19      3 mg         20      3 mg         21      4 mg         22      3 mg           23      3 mg         24      4 mg         25      3 mg         26      3 mg         27      3 mg         28      4 mg         29      3 mg           30      3 mg                Date Details    This INR check               How to take your warfarin dose     To take:  3 mg Take one and a half of the 2 mg tablets.    To take:  4 mg Take two of the 2 mg tablets.           May 2017 Details    Sun Mon Tue Wed Thu Fri Sat      1      4 mg         2      3 mg         3      3 mg         4      3 mg         5      4 mg         6      3 mg           7      3 mg         8      4 mg         9               10               11               12               13                 14               15               16               17               18               19               20                 21               22               23               24               25               26               27                 28               29               30               31                   Date Details   No additional details    Date of next INR:  2017         How to take your  warfarin dose     To take:  3 mg Take one and a half of the 2 mg tablets.    To take:  4 mg Take two of the 2 mg tablets.             Description          Decrease Warfarin/Coumadin slightly and recheck INR in 3 weeks.  LINDY KOO RN ....................  4/17/2017   8:55 AM                          Anticoagulation Summary as of 4/17/2017     INR goal 2.0-3.0    Today's INR 2.9    Next INR check 5/8/2017          Call your Anticoagulation Clinic at Dept: [phone]   if:   1. Any medications are started, stopped, or there is a change in dose.  2. You experience any bleeding that is not easily stopped or if it is recurrent.  3. You notice an increase in bruising or any bruising that does not heal.  4. You are scheduled for surgery, colonoscopy, dental extraction or any other procedure where you may need to stop your Coumadin (warfarin).

## 2018-01-04 NOTE — PATIENT INSTRUCTIONS
Patient Information     Patient Name MRN Ambrose Chavez [id number] Male 8/10/1927      Patient Instructions by Imelda Koo RN at 4/3/2017  8:45 AM     Author:  Imelda Koo RN Service:  (none) Author Type:  NURS- Registered Nurse     Filed:  4/3/2017  8:45 AM Encounter Date:  4/3/2017 Status:  Signed     :  Imelda Koo RN (NURS- Registered Nurse)            2017 Details    Sun  Fri Sat           1                 2               3      4 mg   See details      4      3 mg         5      4 mg         6      3 mg         7      4 mg         8      3 mg           9      3 mg         10      4 mg         11      3 mg         12      4 mg         13      3 mg         14      4 mg         15      3 mg           16      3 mg         17      4 mg         18               19               20               21               22                 23               24               25               26               27               28               29                 30                      Date Details    This INR check       Date of next INR:  2017         How to take your warfarin dose     To take:  3 mg Take one and a half of the 2 mg tablets.    To take:  4 mg Take two of the 2 mg tablets.             Description          Increase dose and recheck in 2 weeks.  IMELDA KOO RN ....................  4/3/2017   8:44 AM    Discussed with the patient increased risk of clotting.                          Anticoagulation Summary as of 4/3/2017     INR goal 2.0-3.0    Today's INR 1.5!    Next INR check 2017          Call your Anticoagulation Clinic at Dept: [phone]   if:   1. Any medications are started, stopped, or there is a change in dose.  2. You experience any bleeding that is not easily stopped or if it is recurrent.  3. You notice an increase in bruising or any bruising that does not heal.  You are scheduled for surgery, colonoscopy, dental extraction or any  other procedure where you may need to stop your Coumadin (warfarin).

## 2018-01-05 NOTE — PATIENT INSTRUCTIONS
Patient Information     Patient Name MRN Ambrose Chavez [id number] Male 8/10/1927      Patient Instructions by Ella Thomas RN at 2017  9:15 AM     Author:  Ella Thomas RN  Service:  (none) Author Type:  NURS- Registered Nurse     Filed:  2017  8:54 AM  Encounter Date:  2017 Status:  Addendum     :  Ella Thomas RN (NURS- Registered Nurse)        Related Notes: Original Note by Ella Thomas RN (NURS- Registered Nurse) filed at 2017  8:52 AM            May 2017 Details    Sun Mon Tue Wed Thu Fri Sat      1               2               3               4               5               6                 7               8               9               10               11               12               13                 14               15               16               17               18               19               20                 21               22               23               24               25      3 mg   See details      26      3 mg         27      3 mg           28      3 mg         29      2 mg         30      3 mg         31      3 mg             Date Details    This INR check               How to take your warfarin dose     To take:  2 mg Take one of the 2 mg tablets.    To take:  3 mg Take one and a half of the 2 mg tablets.           2017 Details    Sun Mon Tue Wed Thu Fri Sat         1      3 mg         2      3 mg         3      3 mg           4      3 mg         5      2 mg         6      3 mg         7      3 mg         8      3 mg         9      3 mg         10      3 mg           11      3 mg         12      2 mg         13      3 mg         14      3 mg         15      3 mg         16               17                 18               19               20               21               22               23               24                 25               26               27               28               29               30                  Date Details   No additional details    Date of next INR:  6/15/2017         How to take your warfarin dose     To take:  2 mg Take one of the 2 mg tablets.    To take:  3 mg Take one and a half of the 2 mg tablets.             Description          Continue same dose and  recheck in 3 weeks.  ..............CHE OTERO RN ....................  5/25/2017   8:51 AM                              Anticoagulation Summary as of 5/25/2017     INR goal 2.0-3.0    Today's INR 2.6    Next INR check 6/15/2017          Call your Anticoagulation Clinic at Dept: [phone]   if:   1. Any medications are started, stopped, or there is a change in dose.  2. You experience any bleeding that is not easily stopped or if it is recurrent.  3. You notice an increase in bruising or any bruising that does not heal.  4. You are scheduled for surgery, colonoscopy, dental extraction or any other procedure where you may need to stop your Coumadin (warfarin).

## 2018-01-05 NOTE — PATIENT INSTRUCTIONS
Patient Information     Patient Name MRN Ambrose Chavez [id number] Male 8/10/1927      Patient Instructions by Elba Pereira RN at 5/15/2017  9:15 AM     Author:  Elba Pereira RN Service:  (none) Author Type:  NURS- Registered Nurse     Filed:  5/15/2017  9:06 AM Encounter Date:  5/15/2017 Status:  Signed     :  Elba Pereira RN (NURS- Registered Nurse)            May 2017 Details    Sun  Fri Sat      1               2               3               4               5               6                 7               8               9               10               11               12               13                 14               15      2 mg   See details      16      3 mg         17      3 mg         18      3 mg         19      3 mg         20      3 mg           21    3 mg         22      2 mg         23      3 mg         24      3 mg         25      3 mg         26      3 mg         27      3 mg           28      3 mg         29      2 mg         30               31                   Date Details   05/15 This INR check       Date of next INR:  2017         How to take your warfarin dose     To take:  2 mg Take one of the 2 mg tablets.    To take:  3 mg Take one and a half of the 2 mg tablets.             Description          Decrease dose and recheck in 2 weeks.  ..............Elba Pereira RN.............  5/15/2017    9:05 AM                              Anticoagulation Summary as of 5/15/2017     INR goal 2.0-3.0    Today's INR 3.6    Next INR check 2017          Call your Anticoagulation Clinic at Dept: [phone]   if:   1. Any medications are started, stopped, or there is a change in dose.  2. You experience any bleeding that is not easily stopped or if it is recurrent.  3. You notice an increase in bruising or any bruising that does not heal.  4. You are scheduled for surgery, colonoscopy, dental extraction or any other procedure where you may need  to stop your Coumadin (warfarin).

## 2018-01-08 ENCOUNTER — HISTORY (OUTPATIENT)
Dept: PEDIATRICS | Facility: OTHER | Age: 83
End: 2018-01-08

## 2018-01-08 ENCOUNTER — OFFICE VISIT - GICH (OUTPATIENT)
Dept: PEDIATRICS | Facility: OTHER | Age: 83
End: 2018-01-08

## 2018-01-08 DIAGNOSIS — S70.11XA CONTUSION OF RIGHT THIGH: ICD-10-CM

## 2018-01-08 DIAGNOSIS — G70.00 MYASTHENIA GRAVIS WITHOUT (ACUTE) EXACERBATION (H): ICD-10-CM

## 2018-01-08 DIAGNOSIS — Z79.01 LONG TERM CURRENT USE OF ANTICOAGULANT: ICD-10-CM

## 2018-01-09 ENCOUNTER — COMMUNICATION - GICH (OUTPATIENT)
Dept: PEDIATRICS | Facility: OTHER | Age: 83
End: 2018-01-09

## 2018-01-09 DIAGNOSIS — I48.92 ATRIAL FLUTTER (H): ICD-10-CM

## 2018-01-09 DIAGNOSIS — Z79.01 LONG TERM CURRENT USE OF ANTICOAGULANT: ICD-10-CM

## 2018-01-15 ENCOUNTER — ANTICOAGULATION - GICH (OUTPATIENT)
Dept: INTERNAL MEDICINE | Facility: OTHER | Age: 83
End: 2018-01-15

## 2018-01-15 ENCOUNTER — COMMUNICATION - GICH (OUTPATIENT)
Dept: INTERNAL MEDICINE | Facility: OTHER | Age: 83
End: 2018-01-15

## 2018-01-15 DIAGNOSIS — Z79.01 LONG TERM CURRENT USE OF ANTICOAGULANT: ICD-10-CM

## 2018-01-15 DIAGNOSIS — I48.92 ATRIAL FLUTTER (H): ICD-10-CM

## 2018-01-15 DIAGNOSIS — S70.11XD CONTUSION OF RIGHT THIGH, SUBSEQUENT ENCOUNTER: ICD-10-CM

## 2018-01-15 LAB — INR - HISTORICAL: 3.9

## 2018-01-19 ENCOUNTER — HOSPITAL ENCOUNTER (OUTPATIENT)
Dept: PHYSICAL THERAPY | Facility: OTHER | Age: 83
Setting detail: THERAPIES SERIES
End: 2018-01-19
Attending: INTERNAL MEDICINE

## 2018-01-19 DIAGNOSIS — S70.11XD CONTUSION OF RIGHT THIGH, SUBSEQUENT ENCOUNTER: ICD-10-CM

## 2018-01-23 ENCOUNTER — HOSPITAL ENCOUNTER (OUTPATIENT)
Dept: PHYSICAL THERAPY | Facility: OTHER | Age: 83
Setting detail: THERAPIES SERIES
End: 2018-01-23
Attending: INTERNAL MEDICINE

## 2018-01-26 ENCOUNTER — HOSPITAL ENCOUNTER (OUTPATIENT)
Dept: PHYSICAL THERAPY | Facility: OTHER | Age: 83
Setting detail: THERAPIES SERIES
End: 2018-01-26
Attending: INTERNAL MEDICINE

## 2018-01-26 VITALS
HEART RATE: 69 BPM | HEART RATE: 70 BPM | SYSTOLIC BLOOD PRESSURE: 122 MMHG | WEIGHT: 171.8 LBS | DIASTOLIC BLOOD PRESSURE: 80 MMHG | BODY MASS INDEX: 27.88 KG/M2 | BODY MASS INDEX: 26.97 KG/M2 | HEIGHT: 67 IN | SYSTOLIC BLOOD PRESSURE: 132 MMHG | TEMPERATURE: 97.9 F | WEIGHT: 178 LBS | DIASTOLIC BLOOD PRESSURE: 78 MMHG

## 2018-01-26 VITALS
WEIGHT: 169.6 LBS | WEIGHT: 173.6 LBS | HEART RATE: 66 BPM | HEIGHT: 67 IN | WEIGHT: 164 LBS | SYSTOLIC BLOOD PRESSURE: 138 MMHG | BODY MASS INDEX: 25.74 KG/M2 | SYSTOLIC BLOOD PRESSURE: 132 MMHG | DIASTOLIC BLOOD PRESSURE: 70 MMHG | DIASTOLIC BLOOD PRESSURE: 64 MMHG | HEART RATE: 72 BPM | DIASTOLIC BLOOD PRESSURE: 90 MMHG | SYSTOLIC BLOOD PRESSURE: 140 MMHG | BODY MASS INDEX: 27.19 KG/M2 | HEART RATE: 76 BPM

## 2018-01-26 VITALS
SYSTOLIC BLOOD PRESSURE: 128 MMHG | BODY MASS INDEX: 26 KG/M2 | HEART RATE: 72 BPM | DIASTOLIC BLOOD PRESSURE: 80 MMHG | WEIGHT: 166 LBS

## 2018-01-26 VITALS
BODY MASS INDEX: 27.12 KG/M2 | HEART RATE: 88 BPM | DIASTOLIC BLOOD PRESSURE: 80 MMHG | DIASTOLIC BLOOD PRESSURE: 80 MMHG | SYSTOLIC BLOOD PRESSURE: 120 MMHG | HEIGHT: 67 IN | TEMPERATURE: 100.7 F | SYSTOLIC BLOOD PRESSURE: 130 MMHG | DIASTOLIC BLOOD PRESSURE: 82 MMHG | HEIGHT: 67 IN | TEMPERATURE: 97.3 F | WEIGHT: 172.8 LBS | SYSTOLIC BLOOD PRESSURE: 122 MMHG | HEART RATE: 72 BPM | BODY MASS INDEX: 27.06 KG/M2 | WEIGHT: 175.4 LBS | WEIGHT: 172.4 LBS | HEART RATE: 64 BPM

## 2018-01-26 VITALS
SYSTOLIC BLOOD PRESSURE: 122 MMHG | HEART RATE: 60 BPM | BODY MASS INDEX: 27.03 KG/M2 | TEMPERATURE: 97.6 F | DIASTOLIC BLOOD PRESSURE: 70 MMHG | WEIGHT: 172.6 LBS

## 2018-01-31 ENCOUNTER — HOSPITAL ENCOUNTER (OUTPATIENT)
Dept: PHYSICAL THERAPY | Facility: OTHER | Age: 83
Setting detail: THERAPIES SERIES
End: 2018-01-31
Attending: INTERNAL MEDICINE

## 2018-02-02 ENCOUNTER — HOSPITAL ENCOUNTER (OUTPATIENT)
Dept: PHYSICAL THERAPY | Facility: OTHER | Age: 83
Setting detail: THERAPIES SERIES
End: 2018-02-02
Attending: INTERNAL MEDICINE

## 2018-02-05 ENCOUNTER — HOSPITAL ENCOUNTER (OUTPATIENT)
Dept: PHYSICAL THERAPY | Facility: OTHER | Age: 83
Setting detail: THERAPIES SERIES
End: 2018-02-05
Attending: INTERNAL MEDICINE

## 2018-02-05 ENCOUNTER — ANTICOAGULATION - GICH (OUTPATIENT)
Dept: INTERNAL MEDICINE | Facility: OTHER | Age: 83
End: 2018-02-05

## 2018-02-05 DIAGNOSIS — Z79.01 LONG TERM CURRENT USE OF ANTICOAGULANT: ICD-10-CM

## 2018-02-05 DIAGNOSIS — I48.92 ATRIAL FLUTTER (H): ICD-10-CM

## 2018-02-05 LAB — INR - HISTORICAL: 1.6

## 2018-02-09 VITALS
TEMPERATURE: 98.2 F | WEIGHT: 172 LBS | HEIGHT: 67 IN | SYSTOLIC BLOOD PRESSURE: 118 MMHG | HEART RATE: 62 BPM | BODY MASS INDEX: 27 KG/M2 | DIASTOLIC BLOOD PRESSURE: 80 MMHG

## 2018-02-11 PROBLEM — I48.92 UNSPECIFIED ATRIAL FLUTTER: Status: ACTIVE | Noted: 2018-02-11

## 2018-02-12 NOTE — PATIENT INSTRUCTIONS
Patient Information     Patient Name MRN Ambrose Chavez [id number] Male 8/10/1927      Patient Instructions by Imelda Koo RN at 2017  9:15 AM     Author:  Imelda Koo RN Service:  (none) Author Type:  NURS- Registered Nurse     Filed:  2017  9:06 AM Encounter Date:  2017 Status:  Signed     :  Imelda Koo RN (NURS- Registered Nurse)            2017 Details    Sun  Fri Sat          1               2                 3               4      3 mg   See details      5      3 mg         6      3 mg         7      3 mg         8      4 mg         9      3 mg           10      3 mg         11      3 mg         12      3 mg         13      3 mg         14      3 mg         15      4 mg         16      3 mg           17      3 mg         18      3 mg         19               20               21               22               23                 24               25               26               27               28               29               30                 31                      Date Details    This INR check       Date of next INR:  2017         How to take your warfarin dose     To take:  3 mg Take one and a half of the 2 mg tablets.    To take:  4 mg Take two of the 2 mg tablets.             Description          Decrease Warfarin/Coumadin slightly and recheck INR in 2 weeks.  IMELDA KOO RN ....................  2017   9:06 AM                          Anticoagulation Summary as of 2017     INR goal 2.0-3.0    Today's INR 3.3!    Next INR check 2017          Call your Anticoagulation Clinic at Dept: [phone]   if:   1. Any medications are started, stopped, or there is a change in dose.  2. You experience any bleeding that is not easily stopped or if it is recurrent.  3. You notice an increase in bruising or any bruising that does not heal.  4. You are scheduled for surgery, colonoscopy, dental extraction or any  other procedure where you may need to stop your Coumadin (warfarin).

## 2018-02-12 NOTE — NURSING NOTE
Patient Information     Patient Name MRN Ambrose Chavez [id number] Male 8/10/1927      Nursing Note by Neela Davey at 2018  3:15 PM     Author:  Neela Davey Service:  (none) Author Type:  (none)     Filed:  2018  3:33 PM Encounter Date:  2018 Status:  Signed     :  Neela Davey            Patient presents to clinic for right leg injury a few weeks ago.  States that fell off the curb at the [facility] and landed on butt and has a huge hematoma on right thigh/hip area.  Neela Davey LPN ....................  2018   3:17 PM

## 2018-02-12 NOTE — TELEPHONE ENCOUNTER
Patient Information     Patient Name MRN Ambrose Chavez [id number] Male 8/10/1927      Telephone Encounter by Elba Pereira RN at 2018  3:50 PM     Author:  Elba Pereira RN Service:  (none) Author Type:  NURS- Registered Nurse     Filed:  2018  3:51 PM Encounter Date:  2018 Status:  Signed     :  Elba Pereira RN (NURS- Registered Nurse)            Anticoagulant    Office visit in the past 12 months.    Last visit with SAROJ CRAIG was on: 2018 in [facility]  Next visit with SAROJ CRAIG is on: No future appointment listed with this provider  Next visit with [facility] is on: No future appointment listed in this department    Lab tests:  PT/INR at least monthly    INR (no units)    Date Value   2017 1.9 (H)     HEMOGLOBIN                (g/dL)    Date Value   2017 12.0 (L)         Prescription refilled per RN Medication Refill Policy.................... Elba Pereira RN ....................  2018   3:50 PM

## 2018-02-12 NOTE — PROGRESS NOTES
"Patient Information     Patient Name MRN Ambrose Chavez [id number] Male 8/10/1927      Progress Notes by Derick Brock MD at 2018  3:15 PM     Author:  Derick Brock MD Service:  (none) Author Type:  Physician     Filed:  2018  4:37 PM Encounter Date:  2018 Status:  Signed     :  Derick Brock MD (Physician)            Subjective  Ambrose Ni is a 89 y.o. male who presents for leg injury. On 2017 he slipped falling off the curb near the [facility]. He was looking over his shoulder and just wasn't paying attention where he was going. He stepped into some snow. Since then he's been having pain in the right buttock/thigh area. He's wondering if he \"chipped bone and now it's chewing up to flush and there.\" He is on warfarin anticoagulation for history of atrial fibrillation. He also has myasthenia gravis which has been well controlled with Mestinon. His children have been bothering him to go to the mail so he has a visit upcoming to see the specialist there.    Problem List/PMH: reviewed in EMR, and made relevant updates today.  Medications: reviewed in EMR, and made relevant updates today.  Allergies: reviewed in EMR, and made relevant updates today.    Social Hx:  Social History        Substance Use Topics          Smoking status:   Former Smoker      Packs/day:  1.25      Years:  25.00      Types:  Cigarettes      Quit date:  1963      Smokeless tobacco:   Never Used      Alcohol use   Yes      Comment: social per pt       Social History Narrative    Retired. , lives with his wife. Children live in [facility]    Cholesterol 220 to 240 with an LDL of 150.     Has specialty care in [facility]              I reviewed social history and made relevant updates today.    Family Hx:   Family History       Problem   Relation Age of Onset     Other  Father       at 79 in his sleep       Heart Disease  Mother      MI       Hypertension  Mother      Stroke  " "Mother      CVA at 76       Other  Brother      CNS hemorrhage at 59       Cancer  Brother      CA of the lung       Other  Sister      Respiratory failure with pulmonary fibrosis       Other  Sister       at age 81 with dementia       Good Health  Son      Good Health  Son      Sarcoidosis  Grandchild      Thyroid Disease  No Family History      Lupus  No Family History      Rheum arthritis  No Family History        Objective  Vitals: reviewed in EMR.  /80 (Cuff Site: Right Arm, Position: Sitting, Cuff Size: Adult Large)  Pulse 62  Temp 98.2  F (36.8  C) (Tympanic)   Ht 1.702 m (5' 7\")  Wt 78 kg (172 lb)  BMI 26.94 kg/m2    Gen: Pleasant male, NAD.  HEENT: MMM  Neck: Supple  Pulm: Breathing easily  Neuro: Lower extremity strength 5/5 at the right ankle  Skin: Large hematoma right posterior buttock and thigh with slight tenderness to palpation and no induration or erythema.  Psychiatric: Normal affect and insight. Does not appear anxious or depressed.  Back: No midline tenderness to palpation.  Musculoskeletal: No tenderness to palpation to lateral pressure on the iliac crests.      Assessment    ICD-10-CM    1. Hematoma of right thigh, initial encounter S70.11XA    2. Warfarin anticoagulation Z79.01    3. Myasthenia gravis (HC) G70.00      we discussed the possibility of a referral to physical therapy based on the recent fall and with the hopes to avoid future falls. He declined that at this time.    Plan   -- Expected clinical course discussed   -- Medications and their side effects discussed   -- follow-up as needed    Signed, Derick Brock MD  Internal Medicine & Pediatrics            "

## 2018-02-12 NOTE — PATIENT INSTRUCTIONS
Patient Information     Patient Name MRN Ambrose Chavez [id number] Male 8/10/1927      Patient Instructions by Elba Pereira RN at 2017  9:15 AM     Author:  Elba Pereira RN Service:  (none) Author Type:  NURS- Registered Nurse     Filed:  2017  9:13 AM Encounter Date:  2017 Status:  Signed     :  Elba Pereira RN (NURS- Registered Nurse)            2017 Details    Sun Mon Tue Wed Thu Fri Sat          1               2                 3               4               5               6               7               8               9                 10               11               12               13               14               15               16                 17               18      4 mg   See details      19      3 mg         20      3 mg         21      3 mg         22      4 mg         23      3 mg           24      3 mg         25      4 mg         26      3 mg         27      3 mg         28      3 mg         29      4 mg         30      3 mg           31      3 mg                Date Details    This INR check               How to take your warfarin dose     To take:  3 mg Take one and a half of the 2 mg tablets.    To take:  4 mg Take two of the 2 mg tablets.           2018 Details    Sun Mon Tue Wed Thu Fri Sat      1      4 mg         2      3 mg         3      3 mg         4      3 mg         5      4 mg         6      3 mg           7      3 mg         8      4 mg         9      3 mg         10      3 mg         11      3 mg         12      4 mg         13      3 mg           14      3 mg         15      4 mg         16               17               18               19               20                 21               22               23               24               25               26               27                 28               29               30               31                   Date Details   No additional details    Date  of next INR:  1/15/2018         How to take your warfarin dose     To take:  3 mg Take one and a half of the 2 mg tablets.    To take:  4 mg Take two of the 2 mg tablets.             Description          Increase dose and recheck in 4 weeks. .............Elba Pereira RN.......... 12/18/2017  9:12 AM                          Anticoagulation Summary as of 12/18/2017     INR goal 2.0-3.0    Today's INR 1.9    Next INR check 1/15/2018          Call your Anticoagulation Clinic at Dept: [phone]   if:   1. Any medications are started, stopped, or there is a change in dose.  2. You experience any bleeding that is not easily stopped or if it is recurrent.  3. You notice an increase in bruising or any bruising that does not heal.  4. You are scheduled for surgery, colonoscopy, dental extraction or any other procedure where you may need to stop your Coumadin (warfarin).

## 2018-02-13 NOTE — INITIAL ASSESSMENTS
Patient Information     Patient Name MRN Sex Ambrose Ferris [id number] Male 8/10/1927      Initial Assessments by Gt Doran PT at 2018 12:27 PM     Author:  Gt Doran PT Service:  (none) Author Type:  PT- Physical Therapist     Filed:  2018  3:09 PM Date of Service:  2018 12:27 PM Status:  Signed     :  Gt Doran PT (PT- Physical Therapist)            [facility]  Outpatient PT - Initial Evaluation  Lower Extremity Eval    Date of Service: 2018     Visit 1/10    Patient Name: Ambrose Ni   YOB: 1927   Referring MD/Provider: Dr. Derick Brock  Medical and Treatment Diagnosis: Hematoma of right thigh  PT Treatment Diagnosis: pain, impaired hip ROM, weakness, impaired gait,   Insurance: Medicare  Start of Service: 18  Certification Dates: Start of Service: 18  Medicare/MA Re-Cert Due: 18     Living Situations:  Independent in Living Situation     Preadmission Functional Mobility: Independent    Cognition:  Oriented to Person, Place, and Time.     Were cultural / age or other special adaptations needed? No      Patient is a vulnerable adult: No      Patient is aware of diagnosis: Yes      Risks and benefits explained: Yes    Precautions:  Pacemaker       Subjective      History: Patient states he fell on 17.  He landed on the right buttock.  States he developed hematoma the buttock to the posterior thigh.  States he has been following up with Dr. Brock regarding his coumadin and INR labs.  States he is still having adjustments to his coumadin dosage.  States the left hip and knee are now limited.  States he has greater difficulty donning and doffing shoes and socks.  He cannot cross the right leg. When he sits he leans to the left to decrease pressure on the right buttock.  States he requires use of his upper extremities more now to get up from a chair.  States he has not drove his car since the injury.      Rates  "pain: no pain at rest.  Describes it as \"soreness\"      Current functional limitations:     Prior Level:  Minimal to no difficulty completing the above functional activities.      Past Medical History:     Diagnosis  Date     Aortic valve insufficiency      Congestion of nasal sinus     With chronic cough      Elevated cholesterol     220-240,       History of acute renal failure     Sulfa induced, resolved.      History of tobacco abuse     Past history of 25 years      RECTAL BLEEDING 9/21/2011     SYNCOPE, HX OF 11/2/2011     Past Surgical History:      Procedure  Laterality Date     COLONOSCOPY SCREENING  2005    Next due in 2015       COLONOSCOPY SCREENING  2011    Diverticulosis and adenomatous polyps, next due in 2016       FLEXIBLE SIGMOIDOSCOPY  1997    Barium enema, diverticulosis       TURP  1981    Resection           Occupation:  Retired     Previous Treatment:    Pain Meds / Anti-inflammatory Meds  - No  Physical Therapy - No   Injections - No  Surgery - No  Pain Clinic - No    Diagnostics:  Reviewed (see chart)   Current Medications:  Reviewed (see chart)    Drug Allergies:  Reviewed (see chart)  ?   Latex Allergy:  No       Objective    Items left blank indicate that the test was inappropriate or not meaningful at the time of evaluation and therefore not performed.    Fall Risk Screening:  Timed Up and Go test score= 19 sec    ROM / Strength:                 Norms Left  Right Strength Left  Right   Hip Flexion 120   80 limited by pain Hip Flexion       /5       4-/5   Hip Extension 15   Hip Extension      /5      /5   Hip Abduction 50   Hip Abduction      /5      /5   Hip External Rot. 60 30 0 limited by pain  Hip External Rot.      /5      /5   Hip Internal Rot. 40 10 0 limited by pain  Hip Internal Rot.      /5      /5   Knee Extension 0   Knee Extension      /5      4/5   Knee Flexion 135   Knee Flexion      /5      4/5   Dorsiflexion 20   Dorsiflexion  /5 5/5   Plantarflexion    " Plantarflexion      /5      5/5       Observation:  Resolving ecchymosis in the right thigh.  Increased edema in the right calf.  Increased edema noted over the right lateral gluteal region    Palpation:  Pain over the right trochanteric bursa.      Gait:  Slowed velocity of gait.  No assistive device needed for ambulation     Special Tests:  Patient shifts weight into the left LE and keeps right LE abducted when going from stand to sit and sit to stand sharonda standard height chair.  He requires use of upper extremities for sit to stand.     Today's Intervention:      Exercises:  Heel slide x 10 for right knee and hip ROM   Discussed right leg elevation above the level of the heart   Ankle pumps x 20     Discussed use of cold pack for 10-15 minutes, 2-3 times a day to decrease pain and inflammation over the right greater trochanter.     Home Exercise Program:    cold pack for 10-15 minutes, 2-3 times a day to decrease pain and inflammation over the right greater trochanter.   Heel slide x 10 for right knee and hip ROM, 2 times a day  Discussed right leg elevation above the level of the heart for 20-30 minutes, 3-4 times a day.   Ankle pumps x 20       Assessment    Therapist Assessment / Clinical Impression:  Signs and symptoms consistent with hematoma of right thigh.  The patient is appropriate for physical therapy to address their pain, functional limitations, and physical impairments.     Functional Impairment(s): See subjective on initial evaluation and Functional Assessment / Summary Report from [facility].    Physical Impairment(s):  pain, impaired hip ROM, weakness, impaired gait,      G codes and Modifier based on patient's presentation and clinical judgement:   Current Primary G Code and Modifier:    Per the Patient's intake and/or assessment the Primary G Code is: Mobility .   The Patient's Impairment, Limitation or Restriction Modifier would be best described as: CK - 40% - 60% Impairment.     Goal Primary G  Code and Modifier:    The Patient's G Code Goal would be: Mobility    The Patient's Impairment, Limitation or Restriction Modifier goal would be best described as: CJ - 20% - 40% Impairment.           Goals:  Functional Short Term Goals (4 weeks):   Patient will walk with a normal gait pattern.  Patient will be able to sleep with no disruptions due to pain.  Patient will have improved lower extremity mobility to allow for improved dressing and self-cares with minimal to no pain.  Sit to stand from a chair with equal weight on both legs    Functional Long Term Goals (8 weeks):   Patient will self-manage symptoms and return to prior level of function.    Patient will be independent in their Home Exercise Program.  Patient will tolerate walking with less than 2/10 pain up to 30 minutes for grocery shopping.  Patient will complete stairs with minimal to no pain.  Patient will demonstrate improved strength to allow squatting and lifting activities with no difficulty completing house work related tasks.        Patient participated in goal selection and understand(s) the plan of care: Yes   Patient Potential for Achieving Desired Outcome: Good       Response to Intervention:  Good response to treatment. Patient verbalized understanding of HEP.       Plan    Treatment Plan:      Frequency:   16 visits    Duration of Treatment: 8 weeks    Planned Interventions:    Home Exercise Program development  Therapeutic Exercise (ROM & Strengthening)  Therapeutic Activities  Neuromuscular Re-education  Manual Therapy  E-Stim  Gait Training  Hot Packs / Cold Pack Modalities  Vasopneumatic Compression with Cold Therapy ( Game Ready )        Plan for next visit:  Exercise progression as symptoms allow.  Manual therapy as indicated.     Thank you for your referral to [facility].  Please call with any questions, concerns or comments.  [phone]    The signature, of the referring medical provider, on this  document indicates certification of the above prescribed plan of care and is medically necessary.

## 2018-02-13 NOTE — DISCHARGE SUMMARY
"Patient Information     Patient Name MRN Ambrose Chavez [id number] Male 8/10/1927      Discharge Summaries by Gt Doran PT at 2018 11:05 AM     Author:  Gt Doran PT Service:  (none) Author Type:  PT- Physical Therapist     Filed:  2018  3:26 PM Date of Service:  2018 11:05 AM Status:  Signed     :  Gt Doran PT (PT- Physical Therapist)              [facility] Outpatient Physical Therapy   Discharge Note       Date of Service: 2018      Visit 6/10     Patient Name: Ambrose Ni   YOB: 1927   Referring MD/Provider: Dr. Derick Brock  Medical and Treatment Diagnosis: Hematoma of right thigh  PT Treatment Diagnosis: pain, impaired hip ROM, weakness, impaired gait,   Insurance: Medicare  Start of Service: 18  Certification Dates: Start of Service: 18                     Medicare/MA Re-Cert Due: 18            Precautions:  Pacemaker         Subjective       Patient reports 89% improvement overall.  He can cross his legs better.  He was able to removed the raised toilet seat at home now.             Rates pain: no pain at rest.  Describes it as \"soreness\"              Objective               Today's Intervention:      Nustep x 10 minutes,seat 10, arms 10, seat 5    Exercises:  Heel slide x 20 for right knee and hip ROM   Manual right hip flexion, abduction, adduction,   Manual right Hip ER/IR at 90 degrees flexion  Hook lying right hip abduction and adduction x 10  Supine right single knee to chest 3 x 20-30\"           Home Exercise Program:    cold pack for 10-15 minutes, 2-3 times a day to decrease pain and inflammation over the right greater trochanter.   Heel slide x 10 for right knee and hip ROM, 2 times a day  Discussed right leg elevation above the level of the heart for 20-30 minutes, 3-4 times a day.   Ankle pumps x 20   Hook lying right hip abduction and adduction x 10  Supine right single knee to chest 3 x 20\" "             Assessment     Therapist Assessment / Clinical Impression: Patient is ready to proceed with independent management at this time.     Functional Impairment(s): See subjective on initial evaluation and Functional Assessment / Summary Report from TO.     Physical Impairment(s):  pain, impaired hip ROM, weakness, impaired gait,        G codes and Modifier based on patient's presentation and clinical judgement: 2/5/2018      Goal Primary G Code and Modifier:                         The Patient's G Code Goal would be: Mobility                         The Patient's Impairment, Limitation or Restriction Modifier goal would be best described as: CJ - 20% - 40% Impairment.     The Patient's status upon Discharge is Mobility   The Patient's Impairment, Limitation or Restriction Modifier would be best described as CI - 1% - 20% Impairment.        Goals: reviewed 2/5/2018   Functional Short Term Goals (4 weeks):   Patient will walk with a normal gait pattern. Met   Patient will be able to sleep with no disruptions due to pain. Met   Patient will have improved lower extremity mobility to allow for improved dressing and self-cares with minimal to no pain. Met   Sit to stand from a chair with equal weight on both legs. Met    Functional Long Term Goals (8 weeks):   Patient will self-manage symptoms and return to prior level of function. Progressing, 89% improved  Patient will be independent in their Home Exercise Program. Met   Patient will tolerate walking with less than 2/10 pain up to 30 minutes for grocery shopping. Met   Patient will complete stairs with minimal to no pain. Met   Patient will demonstrate improved strength to allow squatting and lifting activities with no difficulty completing house work related tasks. Progressing            Patient participated in goal selection and understand(s) the plan of care: Yes   Patient Potential for Achieving Desired Outcome: Good        Response to  Intervention:  Good response to treatment. Patient verbalized understanding of HEP.             Plan         Discharge from physical therapy.  Continue home exercise program and independent management.  Patient agrees with the plan.  Instructed to call with and questions. Patient will return to their physician if symptoms do not resolve.

## 2018-02-13 NOTE — PROGRESS NOTES
"Patient Information     Patient Name MRN Ambrose Chavez [id number] Male 8/10/1927      Progress Notes by Gt Doran PT at 2018 10:26 AM     Author:  Gt Doran PT Service:  (none) Author Type:  PT- Physical Therapist     Filed:  2018 10:57 AM Date of Service:  2018 10:26 AM Status:  Signed     :  Gt Doran PT (PT- Physical Therapist)              [facility] Outpatient Physical Therapy   Daily Note       Date of Service: 2018      Visit 4/10     Patient Name: Ambrose Ni   YOB: 1927   Referring MD/Provider: Dr. Derick Brock  Medical and Treatment Diagnosis: Hematoma of right thigh  PT Treatment Diagnosis: pain, impaired hip ROM, weakness, impaired gait,   Insurance: Medicare  Start of Service: 18  Certification Dates: Start of Service: 18                     Medicare/MA Re-Cert Due: 18            Precautions:  Pacemaker         Subjective       Patient states pain is decreasing. States edema is decreasing.  He is getting closer to crossing the right leg over the left.  He can now don and doff his socks independently.  He is back to driving.  States going up and down stairs is getting easier.  He reports benefit of exercises.  He reports 60% improvement overall.          Rates pain: no pain at rest.  Describes it as \"soreness\"              Objective        Edema in right lower leg is decreasing  Pain is decreasing over the right greater trochanter  Improved ability to adduct the right leg        Today's Intervention:      Nustep x 8 minutes,seat 10, arms 10, seat 5    Exercises:  Heel slide x 20 for right knee and hip ROM   Manual right hip flexion, abduction, adduction,   Manual right Hip ER/IR at 90 degrees flexion  Hook lying right hip abduction and adduction x 10  Supine right single knee to chest 3 x 20-30\"           Home Exercise Program:    cold pack for 10-15 minutes, 2-3 times a day to decrease pain and inflammation over the " "right greater trochanter.   Heel slide x 10 for right knee and hip ROM, 2 times a day  Discussed right leg elevation above the level of the heart for 20-30 minutes, 3-4 times a day.   Ankle pumps x 20   Hook lying right hip abduction and adduction x 10  Supine right single knee to chest 3 x 20\"         Assessment     Therapist Assessment / Clinical Impression:  Signs and symptoms consistent with hematoma of right thigh.  The patient is appropriate for physical therapy to address their pain, functional limitations, and physical impairments.      Functional Impairment(s): See subjective on initial evaluation and Functional Assessment / Summary Report from TO.     Physical Impairment(s):  pain, impaired hip ROM, weakness, impaired gait,        G codes and Modifier based on patient's presentation and clinical judgement:   Current Primary G Code and Modifier:                         Per the Patient's intake and/or assessment the Primary G Code is: Mobility .                        The Patient's Impairment, Limitation or Restriction Modifier would be best described as: CK - 40% - 60% Impairment.      Goal Primary G Code and Modifier:                         The Patient's G Code Goal would be: Mobility                         The Patient's Impairment, Limitation or Restriction Modifier goal would be best described as: CJ - 20% - 40% Impairment.               Goals:  Functional Short Term Goals (4 weeks):   Patient will walk with a normal gait pattern.  Patient will be able to sleep with no disruptions due to pain.  Patient will have improved lower extremity mobility to allow for improved dressing and self-cares with minimal to no pain.  Sit to stand from a chair with equal weight on both legs     Functional Long Term Goals (8 weeks):   Patient will self-manage symptoms and return to prior level of function.    Patient will be independent in their Home Exercise Program.  Patient will tolerate walking with less " than 2/10 pain up to 30 minutes for grocery shopping.  Patient will complete stairs with minimal to no pain.  Patient will demonstrate improved strength to allow squatting and lifting activities with no difficulty completing house work related tasks.           Patient participated in goal selection and understand(s) the plan of care: Yes   Patient Potential for Achieving Desired Outcome: Good        Response to Intervention:  Good response to treatment. Patient verbalized understanding of HEP.             Plan         Treatment Plan:                          Frequency:   16 visits                        Duration of Treatment: 8 weeks     Planned Interventions:    Home Exercise Program development  Therapeutic Exercise (ROM & Strengthening)  Therapeutic Activities  Neuromuscular Re-education  Manual Therapy  E-Stim  Gait Training  Hot Packs / Cold Pack Modalities  Vasopneumatic Compression with Cold Therapy ( Game Ready )           Plan for next visit:  Exercise progression as symptoms allow.  Manual therapy as indicated

## 2018-02-13 NOTE — PROGRESS NOTES
"Patient Information     Patient Name MRN Sex Ambrose Ferris [id number] Male 8/10/1927      Progress Notes by Gt Doran PT at 2018 11:08 AM     Author:  Gt Doran PT Service:  (none) Author Type:  PT- Physical Therapist     Filed:  2018 11:10 AM Date of Service:  2018 11:08 AM Status:  Signed     :  Gt Doran PT (PT- Physical Therapist)            Date of Service: 2018      Visit 2/10     Patient Name: Ambrose Ni   YOB: 1927   Referring MD/Provider: Dr. Derick Brock  Medical and Treatment Diagnosis: Hematoma of right thigh  PT Treatment Diagnosis: pain, impaired hip ROM, weakness, impaired gait,   Insurance: Medicare  Start of Service: 18  Certification Dates: Start of Service: 18                     Medicare/MA Re-Cert Due: 18            Precautions:  Pacemaker         Subjective       Patient states pain is decreasing.  States he can now get up from a chair easier now.  He reports pain over the lateral right hip is decreasing.  States swelling in the right lower is improving.  He cannot cross the right leg over the left yet.         Rates pain: no pain at rest.  Describes it as \"soreness\"              Objective              Today's Intervention:       Exercises:  Heel slide x 20 for right knee and hip ROM   Reviewed right leg elevation above the level of the heart to assist with swelling reduction   Ankle pumps x 20      Added:  Manual right hip flexion, abduction, adduction  Hook lying right hip abduction and adduction x 10  Supine right single knee to chest 3 x 20\"      Nustep x 5 minutes,seat 10, arms 10, seat 5        Reviewed use of cold pack for 10-15 minutes, 2-3 times a day to decrease pain and inflammation over the right greater trochanter.         Home Exercise Program:    cold pack for 10-15 minutes, 2-3 times a day to decrease pain and inflammation over the right greater trochanter.   Heel slide x 10 for right knee and " "hip ROM, 2 times a day  Discussed right leg elevation above the level of the heart for 20-30 minutes, 3-4 times a day.   Ankle pumps x 20   Hook lying right hip abduction and adduction x 10  Supine right single knee to chest 3 x 20\"         Assessment     Therapist Assessment / Clinical Impression:  Signs and symptoms consistent with hematoma of right thigh.  The patient is appropriate for physical therapy to address their pain, functional limitations, and physical impairments.      Functional Impairment(s): See subjective on initial evaluation and Functional Assessment / Summary Report from [facility].     Physical Impairment(s):  pain, impaired hip ROM, weakness, impaired gait,        G codes and Modifier based on patient's presentation and clinical judgement:   Current Primary G Code and Modifier:                         Per the Patient's intake and/or assessment the Primary G Code is: Mobility .                        The Patient's Impairment, Limitation or Restriction Modifier would be best described as: CK - 40% - 60% Impairment.      Goal Primary G Code and Modifier:                         The Patient's G Code Goal would be: Mobility                         The Patient's Impairment, Limitation or Restriction Modifier goal would be best described as: CJ - 20% - 40% Impairment.               Goals:  Functional Short Term Goals (4 weeks):   Patient will walk with a normal gait pattern.  Patient will be able to sleep with no disruptions due to pain.  Patient will have improved lower extremity mobility to allow for improved dressing and self-cares with minimal to no pain.  Sit to stand from a chair with equal weight on both legs     Functional Long Term Goals (8 weeks):   Patient will self-manage symptoms and return to prior level of function.    Patient will be independent in their Home Exercise Program.  Patient will tolerate walking with less than 2/10 pain up to 30 minutes for grocery shopping.  Patient " will complete stairs with minimal to no pain.  Patient will demonstrate improved strength to allow squatting and lifting activities with no difficulty completing house work related tasks.           Patient participated in goal selection and understand(s) the plan of care: Yes   Patient Potential for Achieving Desired Outcome: Good        Response to Intervention:  Good response to treatment. Patient verbalized understanding of HEP.         Plan     Treatment Plan:                          Frequency:   16 visits                        Duration of Treatment: 8 weeks     Planned Interventions:    Home Exercise Program development  Therapeutic Exercise (ROM & Strengthening)  Therapeutic Activities  Neuromuscular Re-education  Manual Therapy  E-Stim  Gait Training  Hot Packs / Cold Pack Modalities  Vasopneumatic Compression with Cold Therapy ( Game Ready )           Plan for next visit:  Exercise progression as symptoms allow.  Manual therapy as indicated

## 2018-02-13 NOTE — TELEPHONE ENCOUNTER
Patient Information     Patient Name MRN Ambrose Chavez [id number] Male 8/10/1927      Telephone Encounter by Saida Boss at 1/15/2018  9:15 AM     Author:  Saida Boss Service:  (none) Author Type:  (none)     Filed:  1/15/2018  9:20 AM Encounter Date:  1/15/2018 Status:  Signed     :  Saida Boss            Patient was in for his INR but wanted to know if Dr. Brock would be willing to place an order for physical therapy.  His right hip has been bothering him.  He has trouble getting his socks on and can't touch his toes.  Would [facility] be willing to do that?  Please call to discuss and advise.  Or if order is placed he knows he'll receive a call from the [facility].  Saida Boss ....................  1/15/2018   9:18 AM

## 2018-02-13 NOTE — PATIENT INSTRUCTIONS
Patient Information     Patient Name MRN Ambrose Chavez [id number] Male 8/10/1927      Patient Instructions by Elba Peerira RN at 1/15/2018  9:15 AM     Author:  Elba Pereira RN Service:  (none) Author Type:  NURS- Registered Nurse     Filed:  1/15/2018  9:11 AM Encounter Date:  1/15/2018 Status:  Signed     :  Elba Pereira RN (NURS- Registered Nurse)            2018 Details    Sun Mon Tue Wed Thu Fri Sat      1               2               3               4               5               6                 7               8               9               10               11               12               13                 14               15      3 mg   See details      16      3 mg         17      3 mg         18      3 mg         19      3 mg         20      3 mg           21      3 mg         22      3 mg         23      3 mg         24      3 mg         25      3 mg         26      3 mg         27      3 mg           28      3 mg         29      3 mg         30      3 mg         31      3 mg             Date Details   01/15 This INR check               How to take your warfarin dose     To take:  3 mg Take one and a half of the 2 mg tablets.           2018 Details    Sun Mon Tue Wed Thu Fri Sat         1      3 mg         2      3 mg         3      3 mg           4      3 mg         5      3 mg         6               7               8               9               10                 11               12               13               14               15               16               17                 18               19               20               21               22               23               24                 25               26               27               28                   Date Details   No additional details    Date of next INR:  2018         How to take your warfarin dose     To take:  3 mg Take one and a half of the 2 mg tablets.              Description          Decrease dose and recheck in 3 weeks.  .............Elba Pereira RN............  1/15/2018    9:10 AM                           Anticoagulation Summary as of 1/15/2018     INR goal 2.0-3.0    Today's INR 3.9    Next INR check 2/5/2018          Call your Anticoagulation Clinic at Dept: [phone]   if:   1. Any medications are started, stopped, or there is a change in dose.  2. You experience any bleeding that is not easily stopped or if it is recurrent.  3. You notice an increase in bruising or any bruising that does not heal.  4. You are scheduled for surgery, colonoscopy, dental extraction or any other procedure where you may need to stop your Coumadin (warfarin).

## 2018-02-13 NOTE — PROGRESS NOTES
"Patient Information     Patient Name MRN Ambrose Chavez [id number] Male 8/10/1927      Progress Notes by Gt Doran PT at 2018 12:33 PM     Author:  Gt Doran PT Service:  (none) Author Type:  PT- Physical Therapist     Filed:  2018  1:31 PM Date of Service:  2018 12:33 PM Status:  Signed     :  Gt Doran PT (PT- Physical Therapist)              [facility]   Daily Note       Date of Service: 2018      Visit 5/10     Patient Name: Ambrose Ni   YOB: 1927   Referring MD/Provider: Dr. Derick Brock  Medical and Treatment Diagnosis: Hematoma of right thigh  PT Treatment Diagnosis: pain, impaired hip ROM, weakness, impaired gait,   Insurance: Medicare  Start of Service: 18  Certification Dates: Start of Service: 18                     Medicare/MA Re-Cert Due: 18            Precautions:  Pacemaker         Subjective       Patient reports continued improvement.           Rates pain: no pain at rest.  Describes it as \"soreness\"              Objective               Today's Intervention:      Nustep x 8 minutes,seat 10, arms 10, seat 5    Exercises:  Heel slide x 20 for right knee and hip ROM   Manual right hip flexion, abduction, adduction,   Manual right Hip ER/IR at 90 degrees flexion  Hook lying right hip abduction and adduction x 10  Supine right single knee to chest 3 x 20-30\"           Home Exercise Program:    cold pack for 10-15 minutes, 2-3 times a day to decrease pain and inflammation over the right greater trochanter.   Heel slide x 10 for right knee and hip ROM, 2 times a day  Discussed right leg elevation above the level of the heart for 20-30 minutes, 3-4 times a day.   Ankle pumps x 20   Hook lying right hip abduction and adduction x 10  Supine right single knee to chest 3 x 20\"         Assessment     Therapist Assessment / Clinical Impression:  Signs and symptoms consistent with hematoma of right " thigh.  The patient is appropriate for physical therapy to address their pain, functional limitations, and physical impairments.      Functional Impairment(s): See subjective on initial evaluation and Functional Assessment / Summary Report from [facility].     Physical Impairment(s):  pain, impaired hip ROM, weakness, impaired gait,        G codes and Modifier based on patient's presentation and clinical judgement:   Current Primary G Code and Modifier:                         Per the Patient's intake and/or assessment the Primary G Code is: Mobility .                        The Patient's Impairment, Limitation or Restriction Modifier would be best described as: CK - 40% - 60% Impairment.      Goal Primary G Code and Modifier:                         The Patient's G Code Goal would be: Mobility                         The Patient's Impairment, Limitation or Restriction Modifier goal would be best described as: CJ - 20% - 40% Impairment.               Goals:  Functional Short Term Goals (4 weeks):   Patient will walk with a normal gait pattern.  Patient will be able to sleep with no disruptions due to pain.  Patient will have improved lower extremity mobility to allow for improved dressing and self-cares with minimal to no pain.  Sit to stand from a chair with equal weight on both legs     Functional Long Term Goals (8 weeks):   Patient will self-manage symptoms and return to prior level of function.    Patient will be independent in their Home Exercise Program.  Patient will tolerate walking with less than 2/10 pain up to 30 minutes for grocery shopping.  Patient will complete stairs with minimal to no pain.  Patient will demonstrate improved strength to allow squatting and lifting activities with no difficulty completing house work related tasks.           Patient participated in goal selection and understand(s) the plan of care: Yes   Patient Potential for Achieving Desired Outcome: Good        Response to  Intervention:  Good response to treatment. Patient verbalized understanding of HEP.             Plan         Treatment Plan:                          Frequency:   16 visits                        Duration of Treatment: 8 weeks     Planned Interventions:    Home Exercise Program development  Therapeutic Exercise (ROM & Strengthening)  Therapeutic Activities  Neuromuscular Re-education  Manual Therapy  E-Stim  Gait Training  Hot Packs / Cold Pack Modalities  Vasopneumatic Compression with Cold Therapy ( Game Ready )           Plan for next visit:  Exercise progression as symptoms allow.  Manual therapy as indicated

## 2018-02-13 NOTE — PROGRESS NOTES
"Patient Information     Patient Name MRN Ambrose Chavez [id number] Male 8/10/1927      Progress Notes by Gt Doran PT at 2018  9:32 AM     Author:  Gt Doran PT Service:  (none) Author Type:  PT- Physical Therapist     Filed:  2018 10:22 AM Date of Service:  2018  9:32 AM Status:  Signed     :  Gt Doran PT (PT- Physical Therapist)              [facility]   Daily Note       Date of Service: 2018      Visit 3/10     Patient Name: Ambrose Ni   YOB: 1927   Referring MD/Provider: Dr. Derick Brock  Medical and Treatment Diagnosis: Hematoma of right thigh  PT Treatment Diagnosis: pain, impaired hip ROM, weakness, impaired gait,   Insurance: Medicare  Start of Service: 18  Certification Dates: Start of Service: 18                     Medicare/MA Re-Cert Due: 18            Precautions:  Pacemaker         Subjective       Patient states pain is decreasing.  He is getting closer to crossing the right leg over the left to don/doff socks.  States going up and down stairs is getting easier.  He reports benefit of exercises.  He reports 25-35% improvement overall.          Rates pain: no pain at rest.  Describes it as \"soreness\"              Objective        Edema in right lower leg is decreasing  Pain is decreasing over the right greater trochanter       Today's Intervention:      Nustep x 6.5 minutes,seat 10, arms 10, seat 5    Exercises:  Heel slide x 20 for right knee and hip ROM   Manual right hip flexion, abduction, adduction,   Manual right Hip ER/IR at 90 degrees flexion  Hook lying right hip abduction and adduction x 10  Supine right single knee to chest 3 x 20\"            Home Exercise Program:    cold pack for 10-15 minutes, 2-3 times a day to decrease pain and inflammation over the right greater trochanter.   Heel slide x 10 for right knee and hip ROM, 2 times a day  Discussed right leg elevation above " "the level of the heart for 20-30 minutes, 3-4 times a day.   Ankle pumps x 20   Hook lying right hip abduction and adduction x 10  Supine right single knee to chest 3 x 20\"         Assessment     Therapist Assessment / Clinical Impression:  Signs and symptoms consistent with hematoma of right thigh.  The patient is appropriate for physical therapy to address their pain, functional limitations, and physical impairments.      Functional Impairment(s): See subjective on initial evaluation and Functional Assessment / Summary Report from [facility].     Physical Impairment(s):  pain, impaired hip ROM, weakness, impaired gait,        G codes and Modifier based on patient's presentation and clinical judgement:   Current Primary G Code and Modifier:                         Per the Patient's intake and/or assessment the Primary G Code is: Mobility .                        The Patient's Impairment, Limitation or Restriction Modifier would be best described as: CK - 40% - 60% Impairment.      Goal Primary G Code and Modifier:                         The Patient's G Code Goal would be: Mobility                         The Patient's Impairment, Limitation or Restriction Modifier goal would be best described as: CJ - 20% - 40% Impairment.               Goals:  Functional Short Term Goals (4 weeks):   Patient will walk with a normal gait pattern.  Patient will be able to sleep with no disruptions due to pain.  Patient will have improved lower extremity mobility to allow for improved dressing and self-cares with minimal to no pain.  Sit to stand from a chair with equal weight on both legs     Functional Long Term Goals (8 weeks):   Patient will self-manage symptoms and return to prior level of function.    Patient will be independent in their Home Exercise Program.  Patient will tolerate walking with less than 2/10 pain up to 30 minutes for grocery shopping.  Patient will complete stairs with minimal to no pain.  Patient will " demonstrate improved strength to allow squatting and lifting activities with no difficulty completing house work related tasks.           Patient participated in goal selection and understand(s) the plan of care: Yes   Patient Potential for Achieving Desired Outcome: Good        Response to Intervention:  Good response to treatment. Patient verbalized understanding of HEP.             Plan         Treatment Plan:                          Frequency:   16 visits                        Duration of Treatment: 8 weeks     Planned Interventions:    Home Exercise Program development  Therapeutic Exercise (ROM & Strengthening)  Therapeutic Activities  Neuromuscular Re-education  Manual Therapy  E-Stim  Gait Training  Hot Packs / Cold Pack Modalities  Vasopneumatic Compression with Cold Therapy ( Game Ready )           Plan for next visit:  Exercise progression as symptoms allow.  Manual therapy as indicated

## 2018-02-13 NOTE — PATIENT INSTRUCTIONS
Patient Information     Patient Name MRN Ambrose Chavez [id number] Male 8/10/1927      Patient Instructions by Elba Pereira RN at 2018  9:15 AM     Author:  Elba Pereira RN Service:  (none) Author Type:  NURS- Registered Nurse     Filed:  2018  9:10 AM Encounter Date:  2018 Status:  Signed     :  Elba Pereira RN (NURS- Registered Nurse)            2018 Details    Sun  Fri Sat         1               2               3                 4               5      4 mg   See details      6      4 mg         7      2 mg         8      4 mg         9      2 mg         10      4 mg           11      2 mg         12      4 mg         13      4 mg         14      2 mg         15      4 mg         16      2 mg         17      4 mg           18      2 mg         19      4 mg         20      4 mg         21      2 mg         22      4 mg         23      2 mg         24      4 mg           25      2 mg         26      4 mg         27               28                   Date Details    This INR check       Date of next INR:  2018         How to take your warfarin dose     To take:  2 mg Take one of the 2 mg tablets.    To take:  4 mg Take two of the 2 mg tablets.             Description          Increase dose and recheck in 3 weeks. .............Elba Pereira RN.......... 2018  9:09 AM                            Anticoagulation Summary as of 2018     INR goal 2.0-3.0    Today's INR 1.6    Next INR check 2018          Call your Anticoagulation Clinic at Dept: [phone]   if:   1. Any medications are started, stopped, or there is a change in dose.  2. You experience any bleeding that is not easily stopped or if it is recurrent.  3. You notice an increase in bruising or any bruising that does not heal.  4. You are scheduled for surgery, colonoscopy, dental extraction or any other procedure where you may need to stop your Coumadin (warfarin).

## 2018-02-23 ENCOUNTER — ANTICOAGULATION THERAPY VISIT (OUTPATIENT)
Dept: ANTICOAGULATION | Facility: OTHER | Age: 83
End: 2018-02-23
Attending: INTERNAL MEDICINE
Payer: COMMERCIAL

## 2018-02-23 ENCOUNTER — DOCUMENTATION ONLY (OUTPATIENT)
Dept: FAMILY MEDICINE | Facility: OTHER | Age: 83
End: 2018-02-23

## 2018-02-23 ENCOUNTER — TELEPHONE (OUTPATIENT)
Dept: ANTICOAGULATION | Facility: OTHER | Age: 83
End: 2018-02-23

## 2018-02-23 DIAGNOSIS — Z79.01 LONG-TERM (CURRENT) USE OF ANTICOAGULANTS: ICD-10-CM

## 2018-02-23 DIAGNOSIS — I48.92 ATRIAL FLUTTER, CHRONIC (H): Primary | ICD-10-CM

## 2018-02-23 PROBLEM — K75.9 INFLAMMATORY LIVER DISEASE: Status: ACTIVE | Noted: 2018-02-23

## 2018-02-23 PROBLEM — R31.29 MICROSCOPIC HEMATURIA: Status: ACTIVE | Noted: 2017-08-24

## 2018-02-23 PROBLEM — I10 HYPERTENSION: Status: ACTIVE | Noted: 2018-02-23

## 2018-02-23 PROBLEM — M51.379 DEGENERATION OF LUMBAR OR LUMBOSACRAL INTERVERTEBRAL DISC: Status: ACTIVE | Noted: 2018-02-23

## 2018-02-23 PROBLEM — I35.9 AORTIC VALVE DISORDER: Status: ACTIVE | Noted: 2018-02-23

## 2018-02-23 PROBLEM — N40.0 BPH (BENIGN PROSTATIC HYPERPLASIA): Status: ACTIVE | Noted: 2018-02-23

## 2018-02-23 PROBLEM — K57.30 DIVERTICULAR DISEASE OF COLON: Status: ACTIVE | Noted: 2018-02-23

## 2018-02-23 PROBLEM — E78.5 HYPERLIPIDEMIA: Status: ACTIVE | Noted: 2018-02-23

## 2018-02-23 LAB — INR POINT OF CARE: 1.9 (ref 2–3)

## 2018-02-23 PROCEDURE — 99207 ZZC NO CHARGE NURSE ONLY: CPT

## 2018-02-23 PROCEDURE — 85610 PROTHROMBIN TIME: CPT | Mod: QW

## 2018-02-23 PROCEDURE — 36416 COLLJ CAPILLARY BLOOD SPEC: CPT

## 2018-02-23 RX ORDER — METOPROLOL SUCCINATE 100 MG/1
100 TABLET, EXTENDED RELEASE ORAL DAILY
COMMUNITY
Start: 2017-09-07 | End: 2018-05-30

## 2018-02-23 RX ORDER — ALBUTEROL SULFATE 0.83 MG/ML
2.5 SOLUTION RESPIRATORY (INHALATION) EVERY 4 HOURS PRN
COMMUNITY
Start: 2016-11-19 | End: 2018-08-27

## 2018-02-23 RX ORDER — WARFARIN SODIUM 2 MG/1
TABLET ORAL
COMMUNITY
Start: 2018-02-05 | End: 2018-04-12

## 2018-02-23 RX ORDER — TRIAMCINOLONE ACETONIDE 1 MG/G
CREAM TOPICAL
COMMUNITY
End: 2018-08-27

## 2018-02-23 RX ORDER — PYRIDOSTIGMINE BROMIDE 180 MG/1
180 TABLET, EXTENDED RELEASE ORAL AT BEDTIME
COMMUNITY
Start: 2017-07-26 | End: 2018-04-23

## 2018-02-23 RX ORDER — MULTIVIT WITH MINERALS/LUTEIN
1 TABLET ORAL DAILY
COMMUNITY

## 2018-02-23 RX ORDER — POLYETHYLENE GLYCOL 3350 17 G/17G
17 POWDER, FOR SOLUTION ORAL DAILY
COMMUNITY
Start: 2017-08-08

## 2018-02-23 RX ORDER — PYRIDOSTIGMINE BROMIDE 60 MG/1
60 TABLET ORAL 2 TIMES DAILY
COMMUNITY
Start: 2017-07-26 | End: 2018-08-17

## 2018-02-23 NOTE — TELEPHONE ENCOUNTER
Has the patient previously taken warfarin? yes  If yes, for what indication? Atrial flutter    Does the patient have any of the following indications for a higher range of 2.5-3.5:    Mitral position mechanical valve? no    Brandon-Shiley, Ball and Cage or Monoleaflet valve (regardless of position) no    Other (if yes, please explain) no

## 2018-02-23 NOTE — MR AVS SNAPSHOT
Ambrose Ni   2/23/2018 8:30 AM   Anticoagulation Therapy Visit    Description:  89 year old male   Provider:  [facility]   Department:  [facility]           INR as of 2/23/2018     Today's INR 1.9!      Anticoagulation Summary as of 2/23/2018     INR goal 2.0-3.0   Today's INR 1.9!   Full instructions 2 mg on Sun, Wed, Fri; 4 mg all other days   Next INR check 3/12/2018    Indications   Unspecified atrial flutter [I48.92]  Long-term (current) use of anticoagulants [Z79.01] [Z79.01]         Description     Continue same dose and recheck in 2-3 weeks. ...............Elba Pereira RN    2/23/2018    8:39 AM        February 2018 Details    Sun Mon Tue Wed Thu Fri Sat         1               2               3                 4               5               6               7               8               9               10                 11               12               13               14               15               16               17                 18               19               20               21               22               23      2 mg   See details      24      4 mg           25      2 mg         26      4 mg         27      4 mg         28      2 mg             Date Details   02/23 This INR check               How to take your warfarin dose     To take:  2 mg Take 1 of the 2 mg tablets.    To take:  4 mg Take 2 of the 2 mg tablets.           March 2018 Details    Sun Mon Tue Wed Thu Fri Sat         1      4 mg         2      2 mg         3      4 mg           4      2 mg         5      4 mg         6      4 mg         7      2 mg         8      4 mg         9      2 mg         10      4 mg           11      2 mg         12            13               14               15               16               17                 18               19               20               21               22               23               24                 25               26               27                28               29               30               31                Date Details   No additional details    Date of next INR:  3/12/2018         How to take your warfarin dose     To take:  2 mg Take 1 of the 2 mg tablets.    To take:  4 mg Take 2 of the 2 mg tablets.

## 2018-02-23 NOTE — PROGRESS NOTES
ANTICOAGULATION FOLLOW-UP CLINIC VISIT    Patient Name:  Ambrose Ni  Date:  2/23/2018  Contact Type:  Face to Face    SUBJECTIVE:     Patient Findings     Positives No Problem Findings           OBJECTIVE    INR Protime   Date Value Ref Range Status   02/23/2018 1.9 (A) 2 - 3 Final       ASSESSMENT / PLAN  No question data found.  Anticoagulation Summary as of 2/23/2018     INR goal 2.0-3.0   Today's INR 1.9!   Maintenance plan 2 mg (2 mg x 1) on Sun, Wed, Fri; 4 mg (2 mg x 2) all other days   Full instructions 2 mg on Sun, Wed, Fri; 4 mg all other days   Weekly total 22 mg   Plan last modified Elba Pereira, DAO (2/23/2018)   Next INR check 3/12/2018   Priority INR   Target end date Indefinite    Indications   Unspecified atrial flutter [I48.92]  Long-term (current) use of anticoagulants [Z79.01] [Z79.01]         Anticoagulation Episode Summary     INR check location     Preferred lab     Send INR reminders to  INR    Comments       Anticoagulation Care Providers     Provider Role Specialty Phone number    Derick Brock MD [facility] [phone]            See the Encounter Report to view Anticoagulation Flowsheet and Dosing Calendar (Go to Encounters tab in chart review, and find the Anticoagulation Therapy Visit)        Elba Pereira, RN

## 2018-02-28 ENCOUNTER — TRANSFERRED RECORDS (OUTPATIENT)
Dept: HEALTH INFORMATION MANAGEMENT | Facility: OTHER | Age: 83
End: 2018-02-28

## 2018-03-02 ENCOUNTER — TRANSFERRED RECORDS (OUTPATIENT)
Dept: HEALTH INFORMATION MANAGEMENT | Facility: OTHER | Age: 83
End: 2018-03-02

## 2018-03-12 ENCOUNTER — ANTICOAGULATION THERAPY VISIT (OUTPATIENT)
Dept: ANTICOAGULATION | Facility: OTHER | Age: 83
End: 2018-03-12
Attending: INTERNAL MEDICINE
Payer: COMMERCIAL

## 2018-03-12 DIAGNOSIS — Z79.01 LONG-TERM (CURRENT) USE OF ANTICOAGULANTS: ICD-10-CM

## 2018-03-12 LAB — INR POINT OF CARE: 2.1 (ref 2–3)

## 2018-03-12 PROCEDURE — 36416 COLLJ CAPILLARY BLOOD SPEC: CPT

## 2018-03-12 PROCEDURE — 85610 PROTHROMBIN TIME: CPT | Mod: QW

## 2018-03-12 PROCEDURE — 99207 ZZC NO CHARGE NURSE ONLY: CPT

## 2018-03-12 NOTE — PROGRESS NOTES
ANTICOAGULATION FOLLOW-UP CLINIC VISIT    Patient Name:  Ambrose Ni  Date:  3/12/2018  Contact Type:  Face to Face    SUBJECTIVE:     Patient Findings     Positives OTC meds (cold symptoms, using Robitussin prn, about once daily)           OBJECTIVE    INR Protime   Date Value Ref Range Status   03/12/2018 2.1 2.0 - 3.0 Final       ASSESSMENT / PLAN  INR assessment THER    Recheck INR In: 4 WEEKS    INR Location Clinic      Anticoagulation Summary as of 3/12/2018     INR goal 2.0-3.0   Today's INR 2.1   Maintenance plan 2 mg (2 mg x 1) on Sun, Wed, Fri; 4 mg (2 mg x 2) all other days   Full instructions 2 mg on Sun, Wed, Fri; 4 mg all other days   Weekly total 22 mg   No change documented Imelda Ratliff RN   Plan last modified Elba Pereira RN (2/23/2018)   Next INR check 4/9/2018   Priority INR   Target end date Indefinite    Indications   Unspecified atrial flutter [I48.92]  Long-term (current) use of anticoagulants [Z79.01] [Z79.01]         Anticoagulation Episode Summary     INR check location     Preferred lab     Send INR reminders to  INR    Comments       Anticoagulation Care Providers     Provider Role Specialty Phone number    Derick Brock MD Responsible Pediatrics [phone]            See the Encounter Report to view Anticoagulation Flowsheet and Dosing Calendar (Go to Encounters tab in chart review, and find the Anticoagulation Therapy Visit)        Imelda Ratliff RN

## 2018-03-12 NOTE — MR AVS SNAPSHOT
Ambrose Ni   3/12/2018 9:15 AM   Anticoagulation Therapy Visit    Description:  89 year old male   Provider:  [facility]   Department:  [facility]           INR as of 3/12/2018     Today's INR 2.1      Anticoagulation Summary as of 3/12/2018     INR goal 2.0-3.0   Today's INR 2.1   Full instructions 2 mg on Sun, Wed, Fri; 4 mg all other days   Next INR check 4/9/2018    Indications   Unspecified atrial flutter [I48.92]  Long-term (current) use of anticoagulants [Z79.01] [Z79.01]         Description     Continue same dose of Coumadin/Warfarinand recheck INR in 4 weeks.  Imelda Ratliff ....................  3/12/2018   9:21 AM        March 2018 Details    Sun Mon Tue Wed Thu Fri Sat         1               2               3                 4               5               6               7               8               9               10                 11               12      4 mg   See details      13      4 mg         14      2 mg         15      4 mg         16      2 mg         17      4 mg           18      2 mg         19      4 mg         20      4 mg         21      2 mg         22      4 mg         23      2 mg         24      4 mg           25      2 mg         26      4 mg         27      4 mg         28      2 mg         29      4 mg         30      2 mg         31      4 mg          Date Details   03/12 This INR check               How to take your warfarin dose     To take:  2 mg Take 1 of the 2 mg tablets.    To take:  4 mg Take 2 of the 2 mg tablets.           April 2018 Details    Sun Mon Tue Wed Thu Fri Sat     1      2 mg         2      4 mg         3      4 mg         4      2 mg         5      4 mg         6      2 mg         7      4 mg           8      2 mg         9            10               11               12               13               14                 15               16               17               18               19               20               21                  22               23               24               25               26               27               28                 29               30                     Date Details   No additional details    Date of next INR:  4/9/2018         How to take your warfarin dose     To take:  2 mg Take 1 of the 2 mg tablets.    To take:  4 mg Take 2 of the 2 mg tablets.

## 2018-04-09 ENCOUNTER — ANTICOAGULATION THERAPY VISIT (OUTPATIENT)
Dept: ANTICOAGULATION | Facility: OTHER | Age: 83
End: 2018-04-09
Attending: INTERNAL MEDICINE
Payer: MEDICARE

## 2018-04-09 DIAGNOSIS — Z79.01 LONG-TERM (CURRENT) USE OF ANTICOAGULANTS: ICD-10-CM

## 2018-04-09 LAB — INR POINT OF CARE: 3.5 (ref 2–3)

## 2018-04-09 PROCEDURE — 36416 COLLJ CAPILLARY BLOOD SPEC: CPT | Mod: ZL

## 2018-04-09 PROCEDURE — 85610 PROTHROMBIN TIME: CPT | Mod: QW,ZL

## 2018-04-09 NOTE — MR AVS SNAPSHOT
Ambrose FROST Last   4/9/2018 9:15 AM   Anticoagulation Therapy Visit    Description:  89 year old male   Provider:  [facility]   [facility]           INR as of 4/9/2018     Today's INR 3.5!      Anticoagulation Summary as of 4/9/2018     INR goal 2.0-3.0   Today's INR 3.5!   Full instructions 2 mg on Sun, Wed, Fri; 4 mg all other days   Next INR check 4/23/2018    Indications   Unspecified atrial flutter [I48.92]  Long-term (current) use of anticoagulants [Z79.01] [Z79.01]         Description     Continue same dose and recheck in 2-3 weeks. ...............Elba Pereira RN    4/9/2018    9:12 AM      Your next Anticoagulation Clinic appointment(s)     Apr 09, 2018  9:15 AM CDT   Anticoagulation Visit with [facility] 2   [facility] ([facility])    [address]  [facility] [address]   [phone]              April 2018 Details    Sun Mon Tue Wed Thu Fri Sat     1               2               3               4               5               6               7                 8               9      4 mg   See details      10      4 mg         11      2 mg         12      4 mg         13      2 mg         14      4 mg           15      2 mg         16      4 mg         17      4 mg         18      2 mg         19      4 mg         20      2 mg         21      4 mg           22      2 mg         23            24               25               26               27               28                 29               30                     Date Details   04/09 This INR check       Date of next INR:  4/23/2018         How to take your warfarin dose     To take:  2 mg Take 1 of the 2 mg tablets.    To take:  4 mg Take 2 of the 2 mg tablets.

## 2018-04-09 NOTE — PROGRESS NOTES
ANTICOAGULATION FOLLOW-UP CLINIC VISIT    Patient Name:  Ambrose Ni  Date:  4/9/2018  Contact Type:  Face to Face    SUBJECTIVE:     Patient Findings     Positives No Problem Findings           OBJECTIVE    INR Protime   Date Value Ref Range Status   04/09/2018 3.5 (A) 2.0 - 3.0 Final       ASSESSMENT / PLAN  INR assessment SUPRA    Recheck INR In: 2 WEEKS    INR Location Clinic      Anticoagulation Summary as of 4/9/2018     INR goal 2.0-3.0   Today's INR 3.5!   Maintenance plan 2 mg (2 mg x 1) on Sun, Wed, Fri; 4 mg (2 mg x 2) all other days   Full instructions 2 mg on Sun, Wed, Fri; 4 mg all other days   Weekly total 22 mg   No change documented Elba Pereira RN   Plan last modified Elba Pereira RN (2/23/2018)   Next INR check 4/23/2018   Priority INR   Target end date Indefinite    Indications   Unspecified atrial flutter [I48.92]  Long-term (current) use of anticoagulants [Z79.01] [Z79.01]         Anticoagulation Episode Summary     INR check location     Preferred lab     Send INR reminders to  INR    Comments       Anticoagulation Care Providers     Provider Role Specialty Phone number    Derick Brock MD Responsible Pediatrics [phone]            See the Encounter Report to view Anticoagulation Flowsheet and Dosing Calendar (Go to Encounters tab in chart review, and find the Anticoagulation Therapy Visit)        Elba Pereira RN

## 2018-04-12 DIAGNOSIS — I48.92 ATRIAL FLUTTER, CHRONIC (H): Primary | ICD-10-CM

## 2018-04-12 RX ORDER — WARFARIN SODIUM 2 MG/1
TABLET ORAL
Qty: 144 TABLET | Refills: 1 | Status: SHIPPED | OUTPATIENT
Start: 2018-04-12 | End: 2018-08-27

## 2018-04-12 NOTE — TELEPHONE ENCOUNTER
Prescription approved per [facility] Refill Protocol.  Elba Pereira RN    4/12/2018  1:42 PM

## 2018-04-23 ENCOUNTER — OFFICE VISIT (OUTPATIENT)
Dept: PEDIATRICS | Facility: OTHER | Age: 83
End: 2018-04-23
Attending: INTERNAL MEDICINE
Payer: MEDICARE

## 2018-04-23 ENCOUNTER — ANTICOAGULATION THERAPY VISIT (OUTPATIENT)
Dept: ANTICOAGULATION | Facility: OTHER | Age: 83
End: 2018-04-23
Attending: INTERNAL MEDICINE
Payer: COMMERCIAL

## 2018-04-23 VITALS
BODY MASS INDEX: 27.46 KG/M2 | WEIGHT: 175.3 LBS | DIASTOLIC BLOOD PRESSURE: 74 MMHG | HEART RATE: 76 BPM | SYSTOLIC BLOOD PRESSURE: 118 MMHG

## 2018-04-23 DIAGNOSIS — J31.0 GUSTATORY RHINITIS: ICD-10-CM

## 2018-04-23 DIAGNOSIS — G70.00 MYASTHENIA GRAVIS (H): Primary | ICD-10-CM

## 2018-04-23 DIAGNOSIS — Z79.01 LONG-TERM (CURRENT) USE OF ANTICOAGULANTS: ICD-10-CM

## 2018-04-23 DIAGNOSIS — I77.89 AORTIC ROOT ENLARGEMENT (H): ICD-10-CM

## 2018-04-23 DIAGNOSIS — L29.9 PRURITUS: ICD-10-CM

## 2018-04-23 DIAGNOSIS — K44.9 HIATAL HERNIA: ICD-10-CM

## 2018-04-23 DIAGNOSIS — I35.9 AORTIC VALVE DISORDER: ICD-10-CM

## 2018-04-23 LAB — INR POINT OF CARE: 1.9 (ref 2–3)

## 2018-04-23 PROCEDURE — 85610 PROTHROMBIN TIME: CPT | Mod: QW,ZL

## 2018-04-23 PROCEDURE — 36416 COLLJ CAPILLARY BLOOD SPEC: CPT | Mod: ZL

## 2018-04-23 PROCEDURE — G0463 HOSPITAL OUTPT CLINIC VISIT: HCPCS

## 2018-04-23 PROCEDURE — 99214 OFFICE O/P EST MOD 30 MIN: CPT | Performed by: INTERNAL MEDICINE

## 2018-04-23 RX ORDER — PYRIDOSTIGMINE BROMIDE 180 MG/1
180 TABLET, EXTENDED RELEASE ORAL AT BEDTIME
Qty: 90 TABLET | Refills: 4 | Status: SHIPPED | OUTPATIENT
Start: 2018-04-23 | End: 2018-08-27

## 2018-04-23 ASSESSMENT — PAIN SCALES - GENERAL: PAINLEVEL: NO PAIN (0)

## 2018-04-23 NOTE — PROGRESS NOTES
Subjective  Ambrose Ni is a 89 year old male who presents for medication management.  Since our last visit he was seen at the [facility] regarding myasthenia gravis.  His impression is that they were impressed with his care here.  No additional medication was recommended.  They recommended follow-up with me regarding aortic root enlargement and hiatal hernia.  Overall he has been feeling quite well.  He does get fatigued sometimes during the day.  Is able to do most things he wants to do.  He continues to drive.  In his wife are stressed because the family continues to think patient move into assisted living or nursing home.  She thinks they are fine living in their home.  He has a frequent drippy nose particularly after meals.  Has improved with nasal saline in the past.    Problem List/PMH: reviewed in EMR, and made relevant updates today.  Medications: reviewed in EMR, and made relevant updates today.  Allergies: reviewed in EMR, and made relevant updates today.    Social Hx:  Social History   Substance Use Topics     Smoking status: Former Smoker     Packs/day: 1.25     Years: 25.00     Types: Cigarettes     Quit date: 1963     Smokeless tobacco: Never Used     Alcohol use Yes      Comment: Alcoholic Drinks/day: social per pt     Social History     Social History Narrative    Retired. , lives with his wife. Children live in [facility]      Cholesterol 220 to 240 with an LDL of 150.   Has specialty care in [facility]     I reviewed social history and made relevant updates today.    Family Hx:   Family History   Problem Relation Age of Onset     Other - See Comments Father       at 79 in his sleep     HEART DISEASE Mother      Heart Disease,MI     Hypertension Mother      Hypertension     Other - See Comments Mother      Stroke,CVA at 76     Other - See Comments Brother      CNS hemorrhage at 59     CANCER Brother      Cancer,CA of the lung     Other - See Comments Sister       Respiratory failure with pulmonary fibrosis     Other - See Comments Sister       at age 81 with dementia     Family History Negative Son      Good Health     Family History Negative Son      Good Health     Sarcoidosis Grandchild      Sarcoidosis     Thyroid Disease No family hx of      Thyroid Disease     Lupus No family hx of      Lupus     Rheumatoid Arthritis No family hx of      Rheum arthritis       Objective  Vitals: reviewed in EMR.  /74 (BP Location: Right arm, Patient Position: Sitting, Cuff Size: Adult Large)  Pulse 76  Wt 175 lb 4.8 oz (79.5 kg)  BMI 27.46 kg/m2    Gen: Pleasant male, NAD.  HEENT: MMM  Neck: Supple  Pulm: Breathing easily  Neuro: Grossly intact  Skin: No concerning lesions.  Psychiatric: Normal affect and insight. Does not appear anxious or depressed.    CT scan report from [facility] dated 2018 was personally reviewed with the patient today.    Assessment    ICD-10-CM    1. Myasthenia gravis (H) G70.00 pyridostigmine (MESTINON) 180 MG CR tablet   2. Pruritus L29.9    3. 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 Hiatal hernia K44.9      Glad he is doing well with regards to myasthenia gravis.  No changes in recommendation today.  If symptoms were to worsen would recommend return to neurology for consideration of next level treatment.  With regards to aortic root enlargement, hiatal hernia pathophysiology, expected clinical course, potential treatments were discussed today.  The patient declines any surgical intervention at this time.  However he indicated he may consider it in the future.  We will plan on considering a scan in 6-12 months.    Plan   -- Expected clinical course discussed   -- Medications and their side effects discussed  Patient Instructions    -- No changes   -- May consider CT scan in 6-12 months if you would consider an aorta surgery   -- No change for hernia   -- Try ocean  spray   -- If nose continues to drip, may consider Flonase spray      What Is a Hiatal Hernia?    Hiatal hernia is when the area where the stomach and esophagus meet bulges up through the diaphragm into the chest cavity. In some cases, part of the stomach may bulge above the diaphragm. Stomach acid may move up into the esophagus and cause symptoms. The symptoms are often blamed on gastroesophageal reflux disease (GERD). You may only know about the hernia when it shows up on an X-ray taken for other reasons.   What you may feel  The hiatus is a normal hole in the diaphragm. The esophagus passes through this hole and leads to the stomach. In some cases, part of the stomach may bulge above the diaphragm. This bulge is called a hernia. Stomach acid may move up into the esophagus and cause symptoms.  When you eat, the muscle at the hiatus relaxes to allow food to pass into the stomach. It tightens again to keep food and digestive acids in the stomach.  Many people with hiatal hernias have mild symptoms. You may notice the following GERD symptoms:    Heartburn or other chest discomfort    A feeling of chest fullness after a meal    Frequent burping    Acid taste in the mouth    Trouble swallowing  Treating symptoms  If you have been diagnosed with hiatal hernia, these suggestions may help improve symptoms:    Lose excess weight. Extra weight puts pressure on the stomach and esophagus.    Don t lie down after eating. Sit up for at least an hour after eating. Lying down after eating can increase symptoms.    Avoid certain foods and drinks. These include fatty foods, chocolate, coffee, mint, and other foods that cause symptoms for you.    Don t smoke or drink alcohol. These can worsen symptoms.    Look at your medicines. Discuss your medicines with your healthcare provider. Many medicines can cause symptoms.    Consider an antacid medicine. Ask your healthcare provider about over-the-counter and prescription medicines that  may help.    Ask about surgery, if needed. Surgery is a treatment choice for some people. Your healthcare provider can determine if surgery is an option for you.    Date Last Reviewed: 10/1/2016    5200-5352 [employer]. [address] [place] [address], [place] [address]. All rights reserved. This information is not intended as a substitute for professional medical care. Always follow your healthcare professional's instructions.          Signed, Derick Brock MD  Internal Medicine & Pediatrics

## 2018-04-23 NOTE — NURSING NOTE
Patient presents to clinic for F/U on Myasthenia Gravis.  Pt was seen at [facility] for F/U from neurology and other apts.  Neela Davey LPN ....................  4/23/2018   8:30 AM

## 2018-04-23 NOTE — MR AVS SNAPSHOT
Ambrose Ni   4/23/2018 9:15 AM   Anticoagulation Therapy Visit    Description:  89 year old male   Provider:  [facility] 2   Department:  [facility]           INR as of 4/23/2018     Today's INR 1.9!      Anticoagulation Summary as of 4/23/2018     INR goal 2.0-3.0   Today's INR 1.9!   Full instructions 4/23: 6 mg; Otherwise 2 mg on Sun, Wed, Fri; 4 mg all other days   Next INR check 5/21/2018    Indications   Unspecified atrial flutter [I48.92]  Long-term (current) use of anticoagulants [Z79.01] [Z79.01]         Description     Continue same dose of Coumadin/Warfarinand recheck INR in 4 weeks.   Imelda Ratliff ....................  4/23/2018   9:18 AM        April 2018 Details    Sun Mon Tue Wed Thu Fri Sat     1               2               3               4               5               6               7                 8               9               10               11               12               13               14                 15               16               17               18               19               20               21                 22               23      6 mg   See details      24      4 mg         25      2 mg         26      4 mg         27      2 mg         28      4 mg           29      2 mg         30      4 mg               Date Details   04/23 This INR check               How to take your warfarin dose     To take:  2 mg Take 1 of the 2 mg tablets.    To take:  4 mg Take 2 of the 2 mg tablets.    To take:  6 mg Take 3 of the 2 mg tablets.           May 2018 Details    Sun Mon Tue Wed Thu Fri Sat       1      4 mg         2      2 mg         3      4 mg         4      2 mg         5      4 mg           6      2 mg         7      4 mg         8      4 mg         9      2 mg         10      4 mg         11      2 mg         12      4 mg           13      2 mg         14      4 mg         15      4 mg         16      2 mg         17      4 mg         18      2 mg          19      4 mg           20      2 mg         21            22               23               24               25               26                 27               28               29               30               31                  Date Details   No additional details    Date of next INR:  5/21/2018         How to take your warfarin dose     To take:  2 mg Take 1 of the 2 mg tablets.    To take:  4 mg Take 2 of the 2 mg tablets.

## 2018-04-23 NOTE — MR AVS SNAPSHOT
After Visit Summary   4/23/2018    Ambrose Ni    MRN: [id number]           Patient Information     Date Of Birth          8/10/1927        Visit Information        Provider Department      4/23/2018 8:30 AM Derick Brock MD [facility]        Today's Diagnoses     Myasthenia gravis (H)    -  1    Pruritus        Aortic root enlargement (H)        Aortic valve disorder        Gustatory rhinitis          Care Instructions     -- No changes   -- May consider CT scan in 6-12 months if you would consider an aorta surgery   -- No change for hernia   -- Try ocean spray   -- If nose continues to drip, may consider Flonase spray      What Is a Hiatal Hernia?    Hiatal hernia is when the area where the stomach and esophagus meet bulges up through the diaphragm into the chest cavity. In some cases, part of the stomach may bulge above the diaphragm. Stomach acid may move up into the esophagus and cause symptoms. The symptoms are often blamed on gastroesophageal reflux disease (GERD). You may only know about the hernia when it shows up on an X-ray taken for other reasons.   What you may feel  The hiatus is a normal hole in the diaphragm. The esophagus passes through this hole and leads to the stomach. In some cases, part of the stomach may bulge above the diaphragm. This bulge is called a hernia. Stomach acid may move up into the esophagus and cause symptoms.  When you eat, the muscle at the hiatus relaxes to allow food to pass into the stomach. It tightens again to keep food and digestive acids in the stomach.  Many people with hiatal hernias have mild symptoms. You may notice the following GERD symptoms:    Heartburn or other chest discomfort    A feeling of chest fullness after a meal    Frequent burping    Acid taste in the mouth    Trouble swallowing  Treating symptoms  If you have been diagnosed with hiatal hernia, these suggestions may help improve symptoms:    Lose excess  weight. Extra weight puts pressure on the stomach and esophagus.    Don t lie down after eating. Sit up for at least an hour after eating. Lying down after eating can increase symptoms.    Avoid certain foods and drinks. These include fatty foods, chocolate, coffee, mint, and other foods that cause symptoms for you.    Don t smoke or drink alcohol. These can worsen symptoms.    Look at your medicines. Discuss your medicines with your healthcare provider. Many medicines can cause symptoms.    Consider an antacid medicine. Ask your healthcare provider about over-the-counter and prescription medicines that may help.    Ask about surgery, if needed. Surgery is a treatment choice for some people. Your healthcare provider can determine if surgery is an option for you.    Date Last Reviewed: 10/1/2016    6449-5642 [employer]. [address] [place] [address]. All rights reserved. This information is not intended as a substitute for professional medical care. Always follow your healthcare professional's instructions.                Follow-ups after your visit        Your next 10 appointments already scheduled     Apr 23, 2018  9:15 AM CDT   Anticoagulation Visit with  ANTI COAG 2   [facility]    [address]  [facility] [id number] [phone]              Who to contact     If you have questions or need follow up information about today's clinic visit or your schedule please contact [facility] directly at [phone].  Normal or non-critical lab and imaging results will be communicated to you by MyChart, letter or phone within 4 business days after the clinic has received the results. If you do not hear from us within 7 days, please contact the clinic through MyChart or phone. If you have a critical or abnormal lab result, we will notify you by phone as soon as possible.  Submit refill requests through  "Amanda or call your pharmacy and they will forward the refill request to us. Please allow 3 business days for your refill to be completed.          Additional Information About Your Visit        MyChart Information     [employer] lets you send messages to your doctor, view your test results, renew your prescriptions, schedule appointments and more. To sign up, go to [email or web address] . Click on \"Log in\" on the left side of the screen, which will take you to the Welcome page. Then click on \"Sign up Now\" on the right side of the page.     You will be asked to enter the access code listed below, as well as some personal information. Please follow the directions to create your username and password.     Your access code is: [email or web address]  Expires: 2018  9:03 AM     Your access code will  in 90 days. If you need help or a new code, please call your [facility] clinic or [phone].        Care EveryWhere ID     This is your Care EveryWhere ID. This could be used by other organizations to access your [facility] medical records  [id number]        Your Vitals Were     Pulse BMI (Body Mass Index)                76 27.46 kg/m2           Blood Pressure from Last 3 Encounters:   18 118/74   18 118/80   17 120/82    Weight from Last 3 Encounters:   18 175 lb 4.8 oz (79.5 kg)   18 172 lb (78 kg)   17 172 lb 6.4 oz (78.2 kg)              Today, you had the following     No orders found for display         Where to get your medicines      These medications were sent to [id number] - [place], [place] - [address]  [address], [facility] [address]     Phone:  [phone]     pyridostigmine 180 MG CR tablet          Primary Care Provider Office Phone # Fax #    Derick Ezra Brock [phone]       [address] [facility] [address]        Equal Access to Services     CHAN GARCIA AH: Jonas snyder " Sovanessaali, elizada luqadaha, qaybta kaalshola garcia, joel wakefieldmichael nicky. So [facility] [phone].    ATENCIÓN: Si rita leonard, tiene a garcia disposición servicios gratuitos de asistencia lingüística. Osman al [phone].    We comply with applicable federal civil rights laws and [place] laws. We do not discriminate on the basis of race, color, national origin, age, disability, sex, sexual orientation, or gender identity.            Thank you!     Thank you for choosing [facility]  for your care. Our goal is always to provide you with excellent care. Hearing back from our patients is one way we can continue to improve our services. Please take a few minutes to complete the written survey that you may receive in the mail after your visit with us. Thank you!             Your Updated Medication List - Protect others around you: Learn how to safely use, store and throw away your medicines at www.disposemymeds.org.          This list is accurate as of 4/23/18  9:04 AM.  Always use your most recent med list.                   Brand Name Dispense Instructions for use Diagnosis    albuterol (2.5 MG/3ML) 0.083% neb solution      Inhale 2.5 mg into the lungs every 4 hours as needed        CENTRUM SILVER per tablet      Take 1 tablet by mouth daily        metoprolol succinate 100 MG 24 hr tablet    TOPROL-XL     Take 100 mg by mouth daily        nebulizer nebulization      Nebulizer, disposable neb kit x 4, reuseable neb kit x 1, mask x 1, filters x 1.   use: daily;  Med: albuterol  SHRUTHI: 99 months        polyethylene glycol powder    MIRALAX/GLYCOLAX     Take 17 g by mouth daily        * pyridostigmine 60 MG tablet    MESTINON     Take 60 mg by mouth 2 times daily        * pyridostigmine 180 MG CR tablet    MESTINON    90 tablet    Take 1 tablet (180 mg) by mouth At Bedtime    Myasthenia gravis (H)       triamcinolone 0.1 % cream    KENALOG          warfarin 2 MG tablet    COUMADIN     144 tablet    Take 2 mg x 3 days and 4 mg x 4 days/week or as directed by [facility] clinic    Atrial flutter, chronic (H)       * Notice:  This list has 2 medication(s) that are the same as other medications prescribed for you. Read the directions carefully, and ask your doctor or other care provider to review them with you.

## 2018-04-23 NOTE — PATIENT INSTRUCTIONS
-- No changes   -- May consider CT scan in 6-12 months if you would consider an aorta surgery   -- No change for hernia   -- Try ocean spray   -- If nose continues to drip, may consider Flonase spray      What Is a Hiatal Hernia?    Hiatal hernia is when the area where the stomach and esophagus meet bulges up through the diaphragm into the chest cavity. In some cases, part of the stomach may bulge above the diaphragm. Stomach acid may move up into the esophagus and cause symptoms. The symptoms are often blamed on gastroesophageal reflux disease (GERD). You may only know about the hernia when it shows up on an X-ray taken for other reasons.   What you may feel  The hiatus is a normal hole in the diaphragm. The esophagus passes through this hole and leads to the stomach. In some cases, part of the stomach may bulge above the diaphragm. This bulge is called a hernia. Stomach acid may move up into the esophagus and cause symptoms.  When you eat, the muscle at the hiatus relaxes to allow food to pass into the stomach. It tightens again to keep food and digestive acids in the stomach.  Many people with hiatal hernias have mild symptoms. You may notice the following GERD symptoms:    Heartburn or other chest discomfort    A feeling of chest fullness after a meal    Frequent burping    Acid taste in the mouth    Trouble swallowing  Treating symptoms  If you have been diagnosed with hiatal hernia, these suggestions may help improve symptoms:    Lose excess weight. Extra weight puts pressure on the stomach and esophagus.    Don t lie down after eating. Sit up for at least an hour after eating. Lying down after eating can increase symptoms.    Avoid certain foods and drinks. These include fatty foods, chocolate, coffee, mint, and other foods that cause symptoms for you.    Don t smoke or drink alcohol. These can worsen symptoms.    Look at your medicines. Discuss your medicines with your healthcare provider. Many medicines  can cause symptoms.    Consider an antacid medicine. Ask your healthcare provider about over-the-counter and prescription medicines that may help.    Ask about surgery, if needed. Surgery is a treatment choice for some people. Your healthcare provider can determine if surgery is an option for you.    Date Last Reviewed: 10/1/2016    4217-0445 [employer]. [address] [place] [address], [place] [address]. All rights reserved. This information is not intended as a substitute for professional medical care. Always follow your healthcare professional's instructions.

## 2018-04-23 NOTE — PROGRESS NOTES
ANTICOAGULATION FOLLOW-UP CLINIC VISIT    Patient Name:  Ambrose Ni  Date:  4/23/2018  Contact Type:  Face to Face    SUBJECTIVE:     Patient Findings     Positives No Problem Findings           OBJECTIVE    INR Protime   Date Value Ref Range Status   04/23/2018 1.9 (A) 2.0 - 3.0 Final       ASSESSMENT / PLAN  INR assessment THER    Recheck INR In: 4 WEEKS    INR Location Clinic      Anticoagulation Summary as of 4/23/2018     INR goal 2.0-3.0   Today's INR 1.9!   Maintenance plan 2 mg (2 mg x 1) on Sun, Wed, Fri; 4 mg (2 mg x 2) all other days   Full instructions 4/23: 6 mg; Otherwise 2 mg on Sun, Wed, Fri; 4 mg all other days   Weekly total 22 mg   Plan last modified Elba Pereira RN (2/23/2018)   Next INR check 5/21/2018   Priority INR   Target end date Indefinite    Indications   Unspecified atrial flutter [I48.92]  Long-term (current) use of anticoagulants [Z79.01] [Z79.01]         Anticoagulation Episode Summary     INR check location     Preferred lab     Send INR reminders to  INR    Comments       Anticoagulation Care Providers     Provider Role Specialty Phone number    Derick Brock MD Responsible Pediatrics [phone]            See the Encounter Report to view Anticoagulation Flowsheet and Dosing Calendar (Go to Encounters tab in chart review, and find the Anticoagulation Therapy Visit)        Imelda Ratliff RN

## 2018-04-24 ASSESSMENT — PATIENT HEALTH QUESTIONNAIRE - PHQ9: SUM OF ALL RESPONSES TO PHQ QUESTIONS 1-9: 0

## 2018-05-21 ENCOUNTER — ANTICOAGULATION THERAPY VISIT (OUTPATIENT)
Dept: ANTICOAGULATION | Facility: OTHER | Age: 83
End: 2018-05-21
Attending: INTERNAL MEDICINE
Payer: MEDICARE

## 2018-05-21 DIAGNOSIS — Z79.01 LONG-TERM (CURRENT) USE OF ANTICOAGULANTS: ICD-10-CM

## 2018-05-21 LAB — INR POINT OF CARE: 2.4 (ref 0.86–1.14)

## 2018-05-21 PROCEDURE — 99207 ZZC NO CHARGE NURSE ONLY: CPT

## 2018-05-21 PROCEDURE — 85610 PROTHROMBIN TIME: CPT | Mod: QW,ZL

## 2018-05-21 NOTE — MR AVS SNAPSHOT
Ambrose FROST Last   5/21/2018 9:15 AM   Anticoagulation Therapy Visit    Description:  89 year old male   Provider:  MELISSA ANTI COAG 2   Department:  [facility]           INR as of 5/21/2018     Today's INR 2.4      Anticoagulation Summary as of 5/21/2018     INR goal 2.0-3.0   Today's INR 2.4   Full instructions 2 mg on Sun, Wed, Fri; 4 mg all other days   Next INR check 7/2/2018    Indications   Unspecified atrial flutter [I48.92]  Long-term (current) use of anticoagulants [Z79.01] [Z79.01]         Description     Continue same dose and recheck in 6 weeks. ...............Elba Pereira RN    5/21/2018    9:10 AM        Your next Anticoagulation Clinic appointment(s)     May 21, 2018  9:15 AM CDT   Anticoagulation Visit with MELISSA ANTI COAG 2   [facility] ([facility])    [address]  [facility] [address]   [phone]              May 2018 Details    Sun Mon Tue Wed Thu Fri Sat       1               2               3               4               5                 6               7               8               9               10               11               12                 13               14               15               16               17               18               19                 20               21      4 mg   See details      22      4 mg         23      2 mg         24      4 mg         25      2 mg         26      4 mg           27      2 mg         28      4 mg         29      4 mg         30      2 mg         31      4 mg            Date Details   05/21 This INR check               How to take your warfarin dose     To take:  2 mg Take 1 of the 2 mg tablets.    To take:  4 mg Take 2 of the 2 mg tablets.           June 2018 Details    Sun Mon Tue Wed Thu Fri Sat          1      2 mg         2      4 mg           3      2 mg         4      4 mg         5      4 mg         6      2 mg         7      4 mg         8      2 mg         9      4  mg           10      2 mg         11      4 mg         12      4 mg         13      2 mg         14      4 mg         15      2 mg         16      4 mg           17      2 mg         18      4 mg         19      4 mg         20      2 mg         21      4 mg         22      2 mg         23      4 mg           24      2 mg         25      4 mg         26      4 mg         27      2 mg         28      4 mg         29      2 mg         30      4 mg          Date Details   No additional details            How to take your warfarin dose     To take:  2 mg Take 1 of the 2 mg tablets.    To take:  4 mg Take 2 of the 2 mg tablets.           July 2018 Details    Sun Mon Tue Wed Thu Fri Sat     1      2 mg         2            3               4               5               6               7                 8               9               10               11               12               13               14                 15               16               17               18               19               20               21                 22               23               24               25               26               27               28                 29               30               31                    Date Details   No additional details    Date of next INR:  7/2/2018         How to take your warfarin dose     To take:  2 mg Take 1 of the 2 mg tablets.    To take:  4 mg Take 2 of the 2 mg tablets.

## 2018-05-21 NOTE — PROGRESS NOTES
ANTICOAGULATION FOLLOW-UP CLINIC VISIT    Patient Name:  Ambrose Ni  Date:  5/21/2018  Contact Type:  Face to Face    SUBJECTIVE:     Patient Findings     Positives No Problem Findings           OBJECTIVE    INR Protime   Date Value Ref Range Status   05/21/2018 2.4 (A) 0.86 - 1.14 Final       ASSESSMENT / PLAN  INR assessment THER    Recheck INR In: 6 WEEKS    INR Location Clinic      Anticoagulation Summary as of 5/21/2018     INR goal 2.0-3.0   Today's INR 2.4   Maintenance plan 2 mg (2 mg x 1) on Sun, Wed, Fri; 4 mg (2 mg x 2) all other days   Full instructions 2 mg on Sun, Wed, Fri; 4 mg all other days   Weekly total 22 mg   No change documented Elba Pereira RN   Plan last modified Elba Pereira RN (2/23/2018)   Next INR check 7/2/2018   Priority INR   Target end date Indefinite    Indications   Unspecified atrial flutter [I48.92]  Long-term (current) use of anticoagulants [Z79.01] [Z79.01]         Anticoagulation Episode Summary     INR check location     Preferred lab     Send INR reminders to  INR    Comments       Anticoagulation Care Providers     Provider Role Specialty Phone number    Derick Brock MD Responsible Pediatrics [phone]            See the Encounter Report to view Anticoagulation Flowsheet and Dosing Calendar (Go to Encounters tab in chart review, and find the Anticoagulation Therapy Visit)        Elba Pereira RN

## 2018-05-30 DIAGNOSIS — I10 ESSENTIAL HYPERTENSION: Primary | ICD-10-CM

## 2018-05-31 RX ORDER — METOPROLOL SUCCINATE 100 MG/1
TABLET, EXTENDED RELEASE ORAL
Qty: 90 TABLET | Refills: 4 | Status: SHIPPED | OUTPATIENT
Start: 2018-05-31 | End: 2019-07-25

## 2018-07-02 ENCOUNTER — ANTICOAGULATION THERAPY VISIT (OUTPATIENT)
Dept: ANTICOAGULATION | Facility: OTHER | Age: 83
End: 2018-07-02
Attending: INTERNAL MEDICINE
Payer: MEDICARE

## 2018-07-02 DIAGNOSIS — Z79.01 LONG-TERM (CURRENT) USE OF ANTICOAGULANTS: ICD-10-CM

## 2018-07-02 LAB — INR POINT OF CARE: 2.2 (ref 0.86–1.14)

## 2018-07-02 PROCEDURE — 85610 PROTHROMBIN TIME: CPT | Mod: QW

## 2018-07-02 PROCEDURE — 99207 ZZC NO CHARGE NURSE ONLY: CPT

## 2018-07-02 NOTE — PROGRESS NOTES
ANTICOAGULATION FOLLOW-UP CLINIC VISIT    Patient Name:  Ambrose Ni  Date:  7/2/2018  Contact Type:  Face to Face    SUBJECTIVE:     Patient Findings     Positives No Problem Findings           OBJECTIVE    INR Protime   Date Value Ref Range Status   07/02/2018 2.2 (A) 0.86 - 1.14 Final       ASSESSMENT / PLAN  INR assessment THER    Recheck INR In: 6 WEEKS    INR Location Clinic      Anticoagulation Summary as of 7/2/2018     INR goal 2.0-3.0   Today's INR 2.2   Warfarin maintenance plan 2 mg (2 mg x 1) on Sun, Wed, Fri; 4 mg (2 mg x 2) all other days   Full warfarin instructions 2 mg on Sun, Wed, Fri; 4 mg all other days   Weekly warfarin total 22 mg   No change documented Elba Pereira RN   Plan last modified Elba Pereira RN (2/23/2018)   Next INR check 8/13/2018   Priority INR   Target end date Indefinite    Indications   Unspecified atrial flutter [I48.92]  Long-term (current) use of anticoagulants [Z79.01] [Z79.01]         Anticoagulation Episode Summary     INR check location     Preferred lab     Send INR reminders to  INR    Comments       Anticoagulation Care Providers     Provider Role Specialty Phone number    Derick Brock MD Responsible Pediatrics [phone]            See the Encounter Report to view Anticoagulation Flowsheet and Dosing Calendar (Go to Encounters tab in chart review, and find the Anticoagulation Therapy Visit)        Elba Pereira RN

## 2018-07-02 NOTE — MR AVS SNAPSHOT
Ambrose FROST Last   7/2/2018 9:15 AM   Anticoagulation Therapy Visit    Description:  89 year old male   Provider:  MELISSA ANTI COAG 1   Department:  [facility]           INR as of 7/2/2018     Today's INR 2.2      Anticoagulation Summary as of 7/2/2018     INR goal 2.0-3.0   Today's INR 2.2   Full warfarin instructions 2 mg on Sun, Wed, Fri; 4 mg all other days   Next INR check 8/13/2018    Indications   Unspecified atrial flutter [I48.92]  Long-term (current) use of anticoagulants [Z79.01] [Z79.01]         Description     Continue same dose and recheck in 6 weeks. ...............Elba Pereira RN    7/2/2018    9:00 AM        Your next Anticoagulation Clinic appointment(s)     Jul 02, 2018  9:15 AM CDT   Anticoagulation Visit with MELISSA ANTI COAG 1   [facility] ([facility])    [address]  [place] [address]   [phone]              July 2018 Details    Sun Mon Tue Wed Thu Fri Sat     1               2      4 mg   See details      3      4 mg         4      2 mg         5      4 mg         6      2 mg         7      4 mg           8      2 mg         9      4 mg         10      4 mg         11      2 mg         12      4 mg         13      2 mg         14      4 mg           15      2 mg         16      4 mg         17      4 mg         18      2 mg         19      4 mg         20      2 mg         21      4 mg           22      2 mg         23      4 mg         24      4 mg         25      2 mg         26      4 mg         27      2 mg         28      4 mg           29      2 mg         30      4 mg         31      4 mg              Date Details   07/02 This INR check               How to take your warfarin dose     To take:  2 mg Take 1 of the 2 mg tablets.    To take:  4 mg Take 2 of the 2 mg tablets.           August 2018 Details    Sun Mon Tue Wed Thu Fri Sat        1      2 mg         2      4 mg         3      2 mg         4      4 mg           5       2 mg         6      4 mg         7      4 mg         8      2 mg         9      4 mg         10      2 mg         11      4 mg           12      2 mg         13            14               15               16               17               18                 19               20               21               22               23               24               25                 26               27               28               29               30               31                 Date Details   No additional details    Date of next INR:  8/13/2018         How to take your warfarin dose     To take:  2 mg Take 1 of the 2 mg tablets.    To take:  4 mg Take 2 of the 2 mg tablets.

## 2018-07-24 NOTE — PROGRESS NOTES
Patient Information     Patient Name  Ambrose Ni MRN  [id number] Sex  Male   8/10/1927      Letter by Derick Brock MD at      Author:  Derick Brock MD Service:  (none) Author Type:  (none)    Filed:   Encounter Date:  2017 Status:  (Other)           Ambrose Ni  [address]  [facility] [address]          2017    Dear Mr. Ni:    A small number of red blood cells persist.  Next step is a Urology consult.    Results for orders placed or performed in visit on 17      URINALYSIS W REFLEX MICROSCOPIC IF POSITIVE      Result  Value Ref Range    COLOR                     Yellow Yellow Color    CLARITY                   Clear Clear Clarity    SPECIFIC GRAVITY,URINE    >=1.030 (A) 1.010, 1.015, 1.020, 1.025                    PH,URINE                  5.5 6.0, 7.0, 8.0, 5.5, 6.5, 7.5, 8.5                    UROBILINOGEN,QUALITATIVE  Normal Normal EU/dl    PROTEIN, URINE Trace (A) Negative mg/dL    GLUCOSE, URINE Negative Negative mg/dL    KETONES,URINE             Trace (A) Negative mg/dL    BILIRUBIN,URINE           Negative Negative                    OCCULT BLOOD,URINE        Small (A) Negative                    NITRITE                   Negative Negative                    LEUKOCYTE ESTERASE        Negative Negative                   URINALYSIS MICROSCOPIC      Result  Value Ref Range    RBC 3-5 (A) 0-2, None Seen /HPF    WBC None Seen 0-2, 3-5, None Seen /HPF    BACTERIA                  Few None Seen, Rare, Occasional, Few Bacteria/HPF    EPITHELIAL CELLS          Few None Seen, Few Epi/HPF    OTHER Mucus Present     HYALINE CASTS 3-5 0-2, 3-5 /LPF                If you have any questions or concerns, please call the clinic at [phone].    Signed, Derick Brock MD  Internal Medicine & Pediatrics

## 2018-08-13 ENCOUNTER — ANTICOAGULATION THERAPY VISIT (OUTPATIENT)
Dept: ANTICOAGULATION | Facility: OTHER | Age: 83
End: 2018-08-13
Attending: INTERNAL MEDICINE
Payer: MEDICARE

## 2018-08-13 DIAGNOSIS — Z79.01 LONG-TERM (CURRENT) USE OF ANTICOAGULANTS: ICD-10-CM

## 2018-08-13 LAB — INR POINT OF CARE: 2.3 (ref 0.86–1.14)

## 2018-08-13 PROCEDURE — 85610 PROTHROMBIN TIME: CPT | Mod: QW,ZL

## 2018-08-13 NOTE — MR AVS SNAPSHOT
Ambrose Ni   8/13/2018 9:15 AM   Anticoagulation Therapy Visit    Description:  89 year old male   Provider:  MELISSA ANTI COAG 1   Department:  [facility]           INR as of 8/13/2018     Today's INR 2.3      Anticoagulation Summary as of 8/13/2018     INR goal 2.0-3.0   Today's INR 2.3   Full warfarin instructions 2 mg on Sun, Wed, Fri; 4 mg all other days   Next INR check 9/24/2018    Indications   Unspecified atrial flutter [I48.92]  Long-term (current) use of anticoagulants [Z79.01] [Z79.01]         Description     The current medical regimen is effective;  continue present plan and medications.  Lyubov Marcelo RN on 8/13/2018 at 9:23 AM        August 2018 Details    Sun Mon Tue Wed Thu Fri Sat        1               2               3               4                 5               6               7               8               9               10               11                 12               13      4 mg   See details      14      4 mg         15      2 mg         16      4 mg         17      2 mg         18      4 mg           19      2 mg         20      4 mg         21      4 mg         22      2 mg         23      4 mg         24      2 mg         25      4 mg           26      2 mg         27      4 mg         28      4 mg         29      2 mg         30      4 mg         31      2 mg           Date Details   08/13 This INR check               How to take your warfarin dose     To take:  2 mg Take 1 of the 2 mg tablets.    To take:  4 mg Take 2 of the 2 mg tablets.           September 2018 Details    Sun Mon Tue Wed Thu Fri Sat           1      4 mg           2      2 mg         3      4 mg         4      4 mg         5      2 mg         6      4 mg         7      2 mg         8      4 mg           9      2 mg         10      4 mg         11      4 mg         12      2 mg         13      4 mg         14      2 mg         15      4 mg           16      2 mg         17      4 mg         18      4 mg          19      2 mg         20      4 mg         21      2 mg         22      4 mg           23      2 mg         24            25               26               27               28               29                 30                      Date Details   No additional details    Date of next INR:  9/24/2018         How to take your warfarin dose     To take:  2 mg Take 1 of the 2 mg tablets.    To take:  4 mg Take 2 of the 2 mg tablets.

## 2018-08-13 NOTE — PROGRESS NOTES
ANTICOAGULATION FOLLOW-UP CLINIC VISIT    Patient Name:  Ambrose Ni  Date:  8/13/2018  Contact Type:  Face to Face    SUBJECTIVE:     Patient Findings     Positives No Problem Findings           OBJECTIVE    INR Protime   Date Value Ref Range Status   08/13/2018 2.3 (A) 0.86 - 1.14 Final       ASSESSMENT / PLAN  INR assessment THER    Recheck INR In: 6 WEEKS    INR Location Clinic      Anticoagulation Summary as of 8/13/2018     INR goal 2.0-3.0   Today's INR 2.3   Warfarin maintenance plan 2 mg (2 mg x 1) on Sun, Wed, Fri; 4 mg (2 mg x 2) all other days   Full warfarin instructions 2 mg on Sun, Wed, Fri; 4 mg all other days   Weekly warfarin total 22 mg   No change documented Lyubov Marcelo RN   Plan last modified Elba Pereira RN (2/23/2018)   Next INR check 9/24/2018   Priority INR   Target end date Indefinite    Indications   Unspecified atrial flutter [I48.92]  Long-term (current) use of anticoagulants [Z79.01] [Z79.01]         Anticoagulation Episode Summary     INR check location     Preferred lab     Send INR reminders to  INR    Comments       Anticoagulation Care Providers     Provider Role Specialty Phone number    Derick Brock MD Responsible Pediatrics [phone]            See the Encounter Report to view Anticoagulation Flowsheet and Dosing Calendar (Go to Encounters tab in chart review, and find the Anticoagulation Therapy Visit)        Lyubov Marcelo RN

## 2018-08-16 RX ORDER — PYRIDOSTIGMINE BROMIDE 60 MG/1
TABLET ORAL
Qty: 180 TABLET | Refills: 0 | OUTPATIENT
Start: 2018-08-16

## 2018-08-16 NOTE — TELEPHONE ENCOUNTER
Refused, filled 4-23-18 for #90 X 4 refills. Has refills left. Xochitl Senior RN on 8/16/2018 at 12:15 PM

## 2018-08-27 ENCOUNTER — OFFICE VISIT (OUTPATIENT)
Dept: PEDIATRICS | Facility: OTHER | Age: 83
End: 2018-08-27
Attending: INTERNAL MEDICINE
Payer: COMMERCIAL

## 2018-08-27 VITALS
HEIGHT: 67 IN | HEART RATE: 90 BPM | BODY MASS INDEX: 27.01 KG/M2 | OXYGEN SATURATION: 97 % | DIASTOLIC BLOOD PRESSURE: 80 MMHG | WEIGHT: 172.06 LBS | SYSTOLIC BLOOD PRESSURE: 128 MMHG

## 2018-08-27 DIAGNOSIS — R25.2 CRAMP IN LOWER LEG: ICD-10-CM

## 2018-08-27 DIAGNOSIS — I48.92 ATRIAL FLUTTER, CHRONIC (H): ICD-10-CM

## 2018-08-27 DIAGNOSIS — G70.00 MYASTHENIA GRAVIS (H): Primary | Chronic | ICD-10-CM

## 2018-08-27 DIAGNOSIS — I77.89 AORTIC ROOT ENLARGEMENT (H): ICD-10-CM

## 2018-08-27 PROBLEM — R79.89 OTHER SPECIFIED ABNORMAL FINDINGS OF BLOOD CHEMISTRY: Status: ACTIVE | Noted: 2018-03-02

## 2018-08-27 PROCEDURE — G0463 HOSPITAL OUTPT CLINIC VISIT: HCPCS

## 2018-08-27 PROCEDURE — 99214 OFFICE O/P EST MOD 30 MIN: CPT | Performed by: INTERNAL MEDICINE

## 2018-08-27 RX ORDER — PYRIDOSTIGMINE BROMIDE 60 MG/1
60 TABLET ORAL 2 TIMES DAILY
Qty: 180 TABLET | Refills: 3 | Status: SHIPPED | OUTPATIENT
Start: 2018-08-27

## 2018-08-27 RX ORDER — ACETAMINOPHEN 500 MG
500-1000 TABLET ORAL DAILY PRN
COMMUNITY

## 2018-08-27 RX ORDER — WARFARIN SODIUM 2 MG/1
TABLET ORAL
Qty: 144 TABLET | Refills: 1 | Status: SHIPPED | OUTPATIENT
Start: 2018-08-27 | End: 2019-04-20

## 2018-08-27 RX ORDER — PYRIDOSTIGMINE BROMIDE 180 MG/1
180 TABLET, EXTENDED RELEASE ORAL AT BEDTIME
Qty: 90 TABLET | Refills: 4 | Status: SHIPPED | OUTPATIENT
Start: 2018-08-27

## 2018-08-27 ASSESSMENT — PAIN SCALES - GENERAL: PAINLEVEL: NO PAIN (0)

## 2018-08-27 NOTE — PROGRESS NOTES
Subjective  Ambrose Ni is a 89 year old male who presents for follow-up myasthenia gravis.  Today he saw his pill in his feces.  He plucked out the tablet for me to see.  It is in a baggy.  He wonders if his may be not observing all the medication.  Symptoms wise he is doing fine.  He is able to go outside and do some chores in the afternoon for 1-2 hours.  No double or blurry vision.  No heavy eyelids.  He continues on Mestinon controlled release 180 mg at bedtime, Mestinon 60 mg p.o. twice daily.  At the [facility] they tested and found his aortic aneurysm had enlarged.  His wife wants to know if something should be done about this.  History of hypertension and atrial flutter and continues on metoprolol and warfarin.  He gets some cramping in his legs worse in the evening and during the night, better with stretching.    Problem List/PMH: reviewed in EMR, and made relevant updates today.  Medications: reviewed in EMR, and made relevant updates today.  Allergies: reviewed in EMR, and made relevant updates today.    Social Hx:  Social History   Substance Use Topics     Smoking status: Former Smoker     Packs/day: 1.25     Years: 25.00     Types: Cigarettes     Quit date: 1963     Smokeless tobacco: Never Used     Alcohol use Yes      Comment: Alcoholic Drinks/day: social per pt     Social History     Social History Narrative    Retired. , lives with his wife. Children live in [facility]      Cholesterol 220 to 240 with an LDL of 150.   Has specialty care in [facility]     I reviewed social history and made relevant updates today.    Family Hx:   Family History   Problem Relation Age of Onset     Other - See Comments Father       at 79 in his sleep     HEART DISEASE Mother      Heart Disease,MI     Hypertension Mother      Hypertension     Other - See Comments Mother      Stroke,CVA at 76     Other - See Comments Brother      CNS hemorrhage at 59     Cancer Brother      Cancer,CA of the  "lung     Other - See Comments Sister      Respiratory failure with pulmonary fibrosis     Other - See Comments Sister       at age 81 with dementia     Family History Negative Son      Good Health     Family History Negative Son      Good Health     Sarcoidosis Grandchild      Sarcoidosis     Thyroid Disease No family hx of      Thyroid Disease     Lupus No family hx of      Lupus     Rheumatoid Arthritis No family hx of      Rheum arthritis       Objective  Vitals: reviewed in EMR.  /80  Pulse 90  Ht 5' 7\" (1.702 m)  Wt 172 lb 1 oz (78 kg)  SpO2 97%  BMI 26.95 kg/m2    Gen: Pleasant male, NAD.  HEENT: MMM  Neck: Supple  Pulm: Breathing easily  Neuro: Grossly intact  Skin: No concerning lesions.  Psychiatric: Normal affect and insight. Does not appear anxious or depressed.    Labs:  Lab Results   Component Value Date    HGB 12.0 (L) 2017    HCT 38.0 2017     2017    TRIG 139 2016    HDL 37 2016     2017    BUN 21 2017    CO2 25 2017    INR 2.3 (A) 2018     CT scan of chest  2018  Aortic root enlargement, 44 mm, enlarged from 40 mm    Assessment    ICD-10-CM    1. Myasthenia gravis (H) G70.00 pyridostigmine (MESTINON) 180 MG CR tablet     pyridostigmine (MESTINON) 60 MG tablet   2. 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 Atrial flutter, chronic (H) I48.92 warfarin (COUMADIN) 2 MG tablet   4. Cramp in lower leg R25.2      We had a lengthy discussion today with regards to aortic root enlargement including pathophysiology, expected clinical course, potential treatments.  He seems to be on the fence whether or not he would undergo major surgery to repair this.  We decided to consider follow-up imaging in about 6 months.  If he changes his mind I be happy to place a referral to cardiothoracic surgery.  Symptoms of myasthenia gravis have been well-controlled on current doses of Mestinon.  I think the capsule/tablet in the stool is " likely just the empty vessel.  I encouraged him to discuss this with his pharmacist as well.    Plan   -- Expected clinical course discussed   -- Medications and their side effects discussed   -- Meds refilled as outlined above    Signed, Derick Brock MD  Internal Medicine & Pediatrics

## 2018-08-27 NOTE — MR AVS SNAPSHOT
"              After Visit Summary   8/27/2018    Ambrose Ni    MRN: [id number]           Patient Information     Date Of Birth          8/10/1927        Visit Information        Provider Department      8/27/2018 2:45 PM Derick Brock MD [facility]        Today's Diagnoses     Myasthenia gravis (H)    -  1    Aortic root enlargement (H)        Atrial flutter, chronic (H)        Cramp in lower leg           Follow-ups after your visit        Your next 10 appointments already scheduled     Sep 24, 2018  9:15 AM CDT   Anticoagulation Visit with GH ANTI COAG 1   [facility] ([facility]    [address]  [place] [address] [phone]              Who to contact     If you have questions or need follow up information about today's clinic visit or your schedule please contact [facility] directly at [phone].  Normal or non-critical lab and imaging results will be communicated to you by MyChart, letter or phone within 4 business days after the clinic has received the results. If you do not hear from us within 7 days, please contact the clinic through MyChart or phone. If you have a critical or abnormal lab result, we will notify you by phone as soon as possible.  Submit refill requests through Runnit or call your pharmacy and they will forward the refill request to us. Please allow 3 business days for your refill to be completed.          Additional Information About Your Visit        Care EveryWhere ID     This is your Care EveryWhere ID. This could be used by other organizations to access your [facility] medical records  [id number]        Your Vitals Were     Pulse Height Pulse Oximetry BMI (Body Mass Index)          90 5' 7\" (1.702 m) 97% 26.95 kg/m2         Blood Pressure from Last 3 Encounters:   08/27/18 128/80   04/23/18 118/74   01/08/18 118/80    Weight from Last 3 Encounters:   08/27/18 172 lb 1 " oz (78 kg)   04/23/18 175 lb 4.8 oz (79.5 kg)   01/08/18 172 lb (78 kg)              Today, you had the following     No orders found for display         Today's Medication Changes          These changes are accurate as of 8/27/18  3:53 PM.  If you have any questions, ask your nurse or doctor.               These medicines have changed or have updated prescriptions.        Dose/Directions    * pyridostigmine 180 MG CR tablet   Commonly known as:  MESTINON   This may have changed:  Another medication with the same name was changed. Make sure you understand how and when to take each.   Used for:  Myasthenia gravis (H)   Changed by:  Derick Brock MD        Dose:  180 mg   Take 1 tablet (180 mg) by mouth At Bedtime   Quantity:  90 tablet   Refills:  4       * pyridostigmine 60 MG tablet   Commonly known as:  MESTINON   This may have changed:  See the new instructions.   Used for:  Myasthenia gravis (H)   Changed by:  Derick Brock MD        Dose:  60 mg   Take 1 tablet (60 mg) by mouth 2 times daily   Quantity:  180 tablet   Refills:  3       * Notice:  This list has 2 medication(s) that are the same as other medications prescribed for you. Read the directions carefully, and ask your doctor or other care provider to review them with you.         Where to get your medicines      These medications were sent to [employer] [id number] - [place], [place] - [address]  [address], [facility] [address]     Phone:  [phone]     pyridostigmine 180 MG CR tablet    pyridostigmine 60 MG tablet    warfarin 2 MG tablet                Primary Care Provider Office Phone # Fax #    Derick Brock [phone] [address]  [facility] [address]        Equal Access to Services     CHAN GARCIA AH: Jonas Brock, patricio tejeda, apple kajoanne garcia, joel saunders. So [facility]  [phone].    ATENCIÓN: Si rita leonard, tiene a garcia disposición servicios gratuitos de asistencia lingüística. Osman marroquin [phone].    We comply with applicable federal civil rights laws and [place] laws. We do not discriminate on the basis of race, color, national origin, age, disability, sex, sexual orientation, or gender identity.            Thank you!     Thank you for choosing [facility]  for your care. Our goal is always to provide you with excellent care. Hearing back from our patients is one way we can continue to improve our services. Please take a few minutes to complete the written survey that you may receive in the mail after your visit with us. Thank you!             Your Updated Medication List - Protect others around you: Learn how to safely use, store and throw away your medicines at www.disposemymeds.org.          This list is accurate as of 8/27/18  3:53 PM.  Always use your most recent med list.                   Brand Name Dispense Instructions for use Diagnosis    acetaminophen 500 MG tablet    TYLENOL     Take 500-1,000 mg by mouth daily as needed        CENTRUM SILVER per tablet      Take 1 tablet by mouth daily        metoprolol succinate 100 MG 24 hr tablet    TOPROL-XL    90 tablet    TAKE 1 TABLET BY MOUTH EVERY DAY    Essential hypertension       polyethylene glycol powder    MIRALAX/GLYCOLAX     Take 17 g by mouth daily        * pyridostigmine 180 MG CR tablet    MESTINON    90 tablet    Take 1 tablet (180 mg) by mouth At Bedtime    Myasthenia gravis (H)       * pyridostigmine 60 MG tablet    MESTINON    180 tablet    Take 1 tablet (60 mg) by mouth 2 times daily    Myasthenia gravis (H)       warfarin 2 MG tablet    COUMADIN    144 tablet    Take 2 mg x 3 days and 4 mg x 4 days/week or as directed by [facility] clinic    Atrial flutter, chronic (H)       * Notice:  This list has 2 medication(s) that are the same as other medications prescribed for you. Read the  directions carefully, and ask your doctor or other care provider to review them with you.

## 2018-08-27 NOTE — NURSING NOTE
Recheck Myasthenia Gravis.  [facility] said he has an enlarged Aorta. Mestinon pill is in his stool.

## 2018-09-24 ENCOUNTER — ANTICOAGULATION THERAPY VISIT (OUTPATIENT)
Dept: ANTICOAGULATION | Facility: OTHER | Age: 83
End: 2018-09-24
Attending: INTERNAL MEDICINE
Payer: MEDICARE

## 2018-09-24 DIAGNOSIS — Z79.01 LONG-TERM (CURRENT) USE OF ANTICOAGULANTS: ICD-10-CM

## 2018-09-24 LAB — INR POINT OF CARE: 2.9 (ref 0.86–1.14)

## 2018-09-24 PROCEDURE — 99207 ZZC NO CHARGE NURSE ONLY: CPT

## 2018-09-24 PROCEDURE — 85610 PROTHROMBIN TIME: CPT | Mod: QW

## 2018-09-24 NOTE — MR AVS SNAPSHOT
Ambrose Ni   9/24/2018 9:15 AM   Anticoagulation Therapy Visit    Description:  89 year old male   Provider:  [facility] ANTI COAG 1   Department:  [facility]           INR as of 9/24/2018     Today's INR 2.9      Anticoagulation Summary as of 9/24/2018     INR goal 2.0-3.0   Today's INR 2.9   Full warfarin instructions 2 mg on Sun, Wed, Fri; 4 mg all other days   Next INR check 11/5/2018    Indications   Unspecified atrial flutter [I48.92]  Long-term (current) use of anticoagulants [Z79.01] [Z79.01]         Description     The current medical regimen is effective;  continue present plan and medications. The patient indicates understanding of these issues and agrees with the plan.  Lyubov Marcelo RN on 9/24/2018 at 9:12 AM        Your next Anticoagulation Clinic appointment(s)     Sep 24, 2018  9:15 AM CDT   Anticoagulation Visit with GH ANTI COAG 1   [facility] ([facility]    [address]  [facility] [address]   [phone]              September 2018 Details    Sun Mon Tue Wed Thu Fri Sat           1                 2               3               4               5               6               7               8                 9               10               11               12               13               14               15                 16               17               18               19               20               21               22                 23               24      4 mg   See details      25      4 mg         26      2 mg         27      4 mg         28      2 mg         29      4 mg           30      2 mg                Date Details   09/24 This INR check               How to take your warfarin dose     To take:  2 mg Take 1 of the 2 mg tablets.    To take:  4 mg Take 2 of the 2 mg tablets.           October 2018 Details    Sun Mon Tue Wed Thu Fri Sat      1      4 mg         2      4 mg         3      2 mg         4      4 mg          5      2 mg         6      4 mg           7      2 mg         8      4 mg         9      4 mg         10      2 mg         11      4 mg         12      2 mg         13      4 mg           14      2 mg         15      4 mg         16      4 mg         17      2 mg         18      4 mg         19      2 mg         20      4 mg           21      2 mg         22      4 mg         23      4 mg         24      2 mg         25      4 mg         26      2 mg         27      4 mg           28      2 mg         29      4 mg         30      4 mg         31      2 mg             Date Details   No additional details            How to take your warfarin dose     To take:  2 mg Take 1 of the 2 mg tablets.    To take:  4 mg Take 2 of the 2 mg tablets.           November 2018 Details    Sun Mon Tue Wed Thu Fri Sat         1      4 mg         2      2 mg         3      4 mg           4      2 mg         5            6               7               8               9               10                 11               12               13               14               15               16               17                 18               19               20               21               22               23               24                 25               26               27               28               29               30                 Date Details   No additional details    Date of next INR:  11/5/2018         How to take your warfarin dose     To take:  2 mg Take 1 of the 2 mg tablets.    To take:  4 mg Take 2 of the 2 mg tablets.

## 2018-10-12 ENCOUNTER — TELEPHONE (OUTPATIENT)
Dept: PEDIATRICS | Facility: OTHER | Age: 83
End: 2018-10-12

## 2018-10-12 NOTE — TELEPHONE ENCOUNTER
Patient called questioning when his last pneumovax was to see if he needed to get one.  I let him know that his last one was in November of 2016.  So he should be good.  Irish Ivan LPN .......10/12/2018 9:52 AM

## 2018-11-05 ENCOUNTER — ANTICOAGULATION THERAPY VISIT (OUTPATIENT)
Dept: ANTICOAGULATION | Facility: OTHER | Age: 83
End: 2018-11-05
Attending: INTERNAL MEDICINE
Payer: COMMERCIAL

## 2018-11-05 ENCOUNTER — OFFICE VISIT (OUTPATIENT)
Dept: PEDIATRICS | Facility: OTHER | Age: 83
End: 2018-11-05
Attending: INTERNAL MEDICINE
Payer: MEDICARE

## 2018-11-05 VITALS
HEART RATE: 70 BPM | HEIGHT: 67 IN | SYSTOLIC BLOOD PRESSURE: 118 MMHG | BODY MASS INDEX: 27.47 KG/M2 | DIASTOLIC BLOOD PRESSURE: 82 MMHG | WEIGHT: 175 LBS

## 2018-11-05 DIAGNOSIS — I77.89 AORTIC ROOT ENLARGEMENT (H): ICD-10-CM

## 2018-11-05 DIAGNOSIS — G70.00 MYASTHENIA GRAVIS (H): Primary | Chronic | ICD-10-CM

## 2018-11-05 DIAGNOSIS — Z79.01 WARFARIN ANTICOAGULATION: ICD-10-CM

## 2018-11-05 LAB — INR POINT OF CARE: 2.7 (ref 2–3)

## 2018-11-05 PROCEDURE — 85610 PROTHROMBIN TIME: CPT | Mod: QW

## 2018-11-05 PROCEDURE — G0463 HOSPITAL OUTPT CLINIC VISIT: HCPCS

## 2018-11-05 PROCEDURE — 99214 OFFICE O/P EST MOD 30 MIN: CPT | Performed by: INTERNAL MEDICINE

## 2018-11-05 ASSESSMENT — PAIN SCALES - GENERAL: PAINLEVEL: NO PAIN (0)

## 2018-11-05 NOTE — MR AVS SNAPSHOT
"              After Visit Summary   11/5/2018    Ambrose Ni    MRN: [id number]           Patient Information     Date Of Birth          8/10/1927        Visit Information        Provider Department      11/5/2018 8:15 AM Derick Brock MD [facility]        Today's Diagnoses     Myasthenia gravis (H)    -  1    Aortic root enlargement (H)        Warfarin anticoagulation           Follow-ups after your visit        Your next 10 appointments already scheduled     Nov 14, 2018  8:00 AM CST   Anticoagulation Visit with GH ANTI COAG 1   [facility] ([facility])    [address]  [place] [id number] [phone]              Who to contact     If you have questions or need follow up information about today's clinic visit or your schedule please contact [facility] directly at [phone].  Normal or non-critical lab and imaging results will be communicated to you by MyChart, letter or phone within 4 business days after the clinic has received the results. If you do not hear from us within 7 days, please contact the clinic through MyChart or phone. If you have a critical or abnormal lab result, we will notify you by phone as soon as possible.  Submit refill requests through Hippflow or call your pharmacy and they will forward the refill request to us. Please allow 3 business days for your refill to be completed.          Additional Information About Your Visit        Care EveryWhere ID     This is your Care EveryWhere ID. This could be used by other organizations to access your [facility] medical records  [id number]        Your Vitals Were     Pulse Height BMI (Body Mass Index)             70 5' 7\" (1.702 m) 27.41 kg/m2          Blood Pressure from Last 3 Encounters:   11/05/18 118/82   08/27/18 128/80   04/23/18 118/74    Weight from Last 3 Encounters:   11/05/18 175 lb (79.4 kg)   08/27/18 172 lb 1 oz (78 kg) "   04/23/18 175 lb 4.8 oz (79.5 kg)              Today, you had the following     No orders found for display       Primary Care Provider Office Phone # Fax #    Derick Ezra Brock [phone] [address]  [place] [address]        Equal Access to Services     [facility] : Hadii aad ku hadasho Soomaali, waaxda luqadaha, qaybta kaalmada adeegyada, waxbradley hannah hayaan adegrayson blissmigdaliarebecca morales . So [facility] [phone].    ATENCIÓN: Si habla español, tiene a garcia disposición servicios gratuitos de asistencia lingüística. Llame al [phone].    We comply with applicable federal civil rights laws and [place] laws. We do not discriminate on the basis of race, color, national origin, age, disability, sex, sexual orientation, or gender identity.            Thank you!     Thank you for choosing [facility] [place]  for your care. Our goal is always to provide you with excellent care. Hearing back from our patients is one way we can continue to improve our services. Please take a few minutes to complete the written survey that you may receive in the mail after your visit with us. Thank you!             Your Updated Medication List - Protect others around you: Learn how to safely use, store and throw away your medicines at www.disposemymeds.org.          This list is accurate as of 11/5/18  5:13 PM.  Always use your most recent med list.                   Brand Name Dispense Instructions for use Diagnosis    acetaminophen 500 MG tablet    TYLENOL     Take 500-1,000 mg by mouth daily as needed        CENTRUM SILVER per tablet      Take 1 tablet by mouth daily        metoprolol succinate 100 MG 24 hr tablet    TOPROL-XL    90 tablet    TAKE 1 TABLET BY MOUTH EVERY DAY    Essential hypertension       polyethylene glycol powder    MIRALAX/GLYCOLAX     Take 17 g by mouth daily        * pyridostigmine 180 MG CR tablet    MESTINON    90 tablet    Take 1 tablet (180 mg) by mouth At  Bedtime    Myasthenia gravis (H)       * pyridostigmine 60 MG tablet    MESTINON    180 tablet    Take 1 tablet (60 mg) by mouth 2 times daily    Myasthenia gravis (H)       warfarin 2 MG tablet    COUMADIN    144 tablet    Take 2 mg x 3 days and 4 mg x 4 days/week or as directed by [facility] clinic    Atrial flutter, chronic (H)       * Notice:  This list has 2 medication(s) that are the same as other medications prescribed for you. Read the directions carefully, and ask your doctor or other care provider to review them with you.

## 2018-11-05 NOTE — MR AVS SNAPSHOT
Ambrose Ni   11/5/2018 9:15 AM   Anticoagulation Therapy Visit    Description:  89 year old male   Provider:  [facility]   Department:  [facility]           INR as of 11/5/2018     Today's INR 2.7      Anticoagulation Summary as of 11/5/2018     INR goal 2.0-3.0   Today's INR 2.7   Full warfarin instructions 11/12: 2 mg; 11/13: 2 mg; Otherwise 2 mg on Sun, Wed, Fri; 4 mg all other days   Next INR check 11/14/2018    Indications   Unspecified atrial flutter [I48.92]  Long-term (current) use of anticoagulants [Z79.01] [Z79.01]         Description     Continue same dose of Coumadin/Warfarin until 11/12/18, then take 1/2 dose x two days prior to tooth extractions 11/14/18.  Recheck INR in morning prior to extraction.  After extraction, restart Coumadin at most recent instucted dose.  Recheck INR one week after restarting Warfarin/Coumadin, unless otherwise instructed by MD.  sinusitis          Your next Anticoagulation Clinic appointment(s)     Nov 05, 2018  9:15 AM CST   Anticoagulation Visit with [facility]   [facility] ([facility])    [address]  [place] [address]   [phone]              November 2018 Details    Sun Mon Tue Wed Thu Fri Sat         1               2               3                 4               5      4 mg   See details      6      4 mg         7      2 mg         8      4 mg         9      2 mg         10      4 mg           11      2 mg         12      2 mg         13      2 mg         14            15               16               17                 18               19               20               21               22               23               24                 25               26               27               28               29               30                 Date Details   11/05 This INR check       Date of next INR:  11/14/2018         How to take your warfarin dose     To take:  2 mg Take 1 of the 2 mg  tablets.    To take:  4 mg Take 2 of the 2 mg tablets.

## 2018-11-05 NOTE — PROGRESS NOTES
ANTICOAGULATION FOLLOW-UP CLINIC VISIT    Patient Name:  Ambrose Ni  Date:  11/5/2018  Contact Type:  Face to Face    SUBJECTIVE:     Patient Findings     Positives Change in medications (Completed Amoxicillin on 10/31/18 (dentist))           OBJECTIVE    INR Protime   Date Value Ref Range Status   11/05/2018 2.7 2.0 - 3.0 Final       ASSESSMENT / PLAN  INR assessment THER    Recheck INR In: 1 WEEK    INR Location Clinic      Anticoagulation Summary as of 11/5/2018     INR goal 2.0-3.0   Today's INR 2.7   Warfarin maintenance plan 2 mg (2 mg x 1) on Sun, Wed, Fri; 4 mg (2 mg x 2) all other days   Full warfarin instructions 11/12: 2 mg; 11/13: 2 mg; Otherwise 2 mg on Sun, Wed, Fri; 4 mg all other days   Weekly warfarin total 22 mg   Plan last modified Elba Pereira RN (2/23/2018)   Next INR check 11/14/2018   Priority INR   Target end date Indefinite    Indications   Unspecified atrial flutter [I48.92]  Long-term (current) use of anticoagulants [Z79.01] [Z79.01]         Anticoagulation Episode Summary     INR check location     Preferred lab     Send INR reminders to  INR    Comments       Anticoagulation Care Providers     Provider Role Specialty Phone number    Derick Brock MD Responsible Pediatrics [phone]            See the Encounter Report to view Anticoagulation Flowsheet and Dosing Calendar (Go to Encounters tab in chart review, and find the Anticoagulation Therapy Visit)        Imelda Ratliff, DAO

## 2018-11-05 NOTE — PROGRESS NOTES
Subjective  Ambrose Ni is a 89 year old male who presents for multiple questions.  They want to discuss an upcoming dental procedure.  He is going to be having 10 teeth pulled so that he can have some implants placed in a bridge created.  They are concerned about whether he should have is done with his history of myasthenia gravis and use of Mestinon.  They are planning on having the procedure done with laughing gas and Novocain injections.  He has a history of aortic root enlargement.  Most recent imaging was done at the [facility] where it was found to have increased from 40 up to 44 mm.  He is really not thinking that he would have any open heart surgeries.  No dizziness.  No chest pain.  No presyncope.  He has a history of atrial fibrillation and continues on warfarin anticoagulation.  His wife says that the dentist requests the INR to be under or equal to 2.5 before doing the procedure.    Problem List/PMH: reviewed in EMR, and made relevant updates today.  Medications: reviewed in EMR, and made relevant updates today.  Allergies: reviewed in EMR, and made relevant updates today.    Social Hx:  Social History   Substance Use Topics     Smoking status: Former Smoker     Packs/day: 1.25     Years: 25.00     Types: Cigarettes     Quit date: 1963     Smokeless tobacco: Never Used     Alcohol use Yes      Comment: Alcoholic Drinks/day: social per pt     Social History     Social History Narrative    Retired. , lives with his wife. Children live in [facility]      Cholesterol 220 to 240 with an LDL of 150.   Has specialty care in [facility]     I reviewed social history and made relevant updates today.    Family Hx:   Family History   Problem Relation Age of Onset     Other - See Comments Father       at 79 in his sleep     HEART DISEASE Mother      Heart Disease,MI     Hypertension Mother      Hypertension     Other - See Comments Mother      Stroke,CVA at 76     Other - See Comments  Brother      CNS hemorrhage at 59     Cancer Brother      Cancer,CA of the lung     Other - See Comments Sister      Respiratory failure with pulmonary fibrosis     Other - See Comments Sister       at age 81 with dementia     Family History Negative Son      Good Health     Family History Negative Son      Good Health     Sarcoidosis Grandchild      Sarcoidosis     Thyroid Disease No family hx of      Thyroid Disease     Lupus No family hx of      Lupus     Rheumatoid Arthritis No family hx of      Rheum arthritis     CT Chest without IV Contrast with 3D Dependent Workstation2018  [facility]  Result Impression     1. New and enlarging bilateral pulmonary nodules are indeterminate.   2. Increasing dilatation of the ascending aorta with a maximal diameter of 44 mm.   3. No evidence of anterior mediastinal mass or adenopathy.     FINDINGS:   Multiple bilateral pulmonary nodules. Some have increased in size since 11/15/2016 such as the 5 mm nodule in the right upper lobe centrally (series 3, image 116) which measured 3 mm previously and the 7 mm nodule in the left lower lobe posteromedially (image 210) which measured 5 mm previously. A few nodules are new such as the 3 mm nodule in the left upper lobe anterolaterally (image 118). Calcified granulomas bilaterally. Prominent emphysematous changes bilaterally. Scattered scarring in the lower lungs.     Increasing dilatation of the ascending aorta with a maximal diameter of 44 mm. This measured 40 mm previously. Aortic valvular calcification. Coronary artery calcification. AV pacemaker. Scattered nodes in the chest are stable without adenopathy. No evidence of anterior mediastinal mass. Large esophageal hiatal hernia.     Probable simple cysts in the liver and left kidney. Hypertrophic changes thoracic spine.       Electronically signed by:    KATIE Zepeda M.D.   [id number] 2018 14:16        Objective  Vitals: reviewed in EMR.  /82 (BP Location: Right  "arm, Patient Position: Sitting, Cuff Size: Adult Large)  Pulse 70  Ht 5' 7\" (1.702 m)  Wt 175 lb (79.4 kg)  BMI 27.41 kg/m2    Gen: Pleasant male, NAD.  HEENT: MMM  Neck: Supple  Pulm: Breathing easily  Neuro: Grossly intact  Skin: No concerning lesions.  Psychiatric: Normal affect and insight. Does not appear anxious or depressed.        Assessment    ICD-10-CM    1. Myasthenia gravis (H) G70.00    2. 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 Warfarin anticoagulation Z79.01      We had a lengthy discussion today with regards to his multiple chronic comorbid medical conditions including myasthenia gravis, aortic root enlargement, atrial fibrillation on warfarin anticoagulation.  We discussed the possibility of performing surgical procedures in multiple locations including outpatient setting, inpatient setting.  We discussed indications for bridging warfarin anticoagulation.    Plan   -- Expected clinical course discussed   -- Medications and their side effects discussed   -- No medication changes   -- Proceed with dental procedure as planned    Signed, Derick Brock MD  Internal Medicine & Pediatrics    Total time 30 minutes, over half spent counseling and coordinating care regarding MG, aortic aneurysm.    "

## 2018-11-05 NOTE — NURSING NOTE
Patient presents to clinic to discuss his upcoming dental procedure.  Neela Davey LPN ....................  11/5/2018   8:20 AM      Medication Reconciliation: complete    Neela Davey LPN

## 2018-11-07 ENCOUNTER — TRANSFERRED RECORDS (OUTPATIENT)
Dept: HEALTH INFORMATION MANAGEMENT | Facility: OTHER | Age: 83
End: 2018-11-07

## 2018-11-14 ENCOUNTER — ANTICOAGULATION THERAPY VISIT (OUTPATIENT)
Dept: ANTICOAGULATION | Facility: OTHER | Age: 83
End: 2018-11-14
Attending: INTERNAL MEDICINE
Payer: MEDICARE

## 2018-11-14 LAB — INR POINT OF CARE: 1.8 (ref 0.86–1.14)

## 2018-11-14 PROCEDURE — 85610 PROTHROMBIN TIME: CPT | Mod: QW

## 2018-11-14 NOTE — PROGRESS NOTES
ANTICOAGULATION FOLLOW-UP CLINIC VISIT    Patient Name:  Ambrose Ni  Date:  11/14/2018  Contact Type:  Face to Face    SUBJECTIVE:     Patient Findings     Positives Intentional hold of therapy (has been taking decreased dose to keep INR below 2.5 for dental procedure)           OBJECTIVE    INR Protime   Date Value Ref Range Status   11/14/2018 1.8 (A) 0.86 - 1.14 Final       ASSESSMENT / PLAN  INR assessment SUB    Recheck INR In: 2 WEEKS    INR Location Clinic      Anticoagulation Summary as of 11/14/2018     INR goal 2.0-3.0   Today's INR 1.8!   Warfarin maintenance plan 2 mg (2 mg x 1) on Sun, Wed, Fri; 4 mg (2 mg x 2) all other days   Full warfarin instructions 2 mg on Sun, Wed, Fri; 4 mg all other days   Weekly warfarin total 22 mg   No change documented Elba Pereira RN   Plan last modified Elba Pereira RN (2/23/2018)   Next INR check 11/28/2018   Priority INR   Target end date Indefinite    Indications   Unspecified atrial flutter [I48.92]  Long-term (current) use of anticoagulants [Z79.01] [Z79.01]         Anticoagulation Episode Summary     INR check location     Preferred lab     Send INR reminders to  INR    Comments       Anticoagulation Care Providers     Provider Role Specialty Phone number    Derick Brock MD [facility] [phone]            See the Encounter Report to view Anticoagulation Flowsheet and Dosing Calendar (Go to Encounters tab in chart review, and find the Anticoagulation Therapy Visit)        Elba Pereira RN

## 2018-11-14 NOTE — MR AVS SNAPSHOT
Ambrose Ni   11/14/2018 8:00 AM   Anticoagulation Therapy Visit    Description:  89 year old male   Provider:  [facility]   Department:  [facility]           INR as of 11/14/2018     Today's INR 1.8!      Anticoagulation Summary as of 11/14/2018     INR goal 2.0-3.0   Today's INR 1.8!   Full warfarin instructions 2 mg on Sun, Wed, Fri; 4 mg all other days   Next INR check 11/28/2018    Indications   Unspecified atrial flutter [I48.92]  Long-term (current) use of anticoagulants [Z79.01] [Z79.01]         Description     Resume normal dose and recheck in 2 weeks. Elba Pereira RN    11/14/2018  8:13 AM        November 2018 Details    Sun Mon Tue Wed Thu Fri Sat         1               2               3                 4               5               6               7               8               9               10                 11               12               13               14      2 mg   See details      15      4 mg         16      2 mg         17      4 mg           18      2 mg         19      4 mg         20      4 mg         21      2 mg         22      4 mg         23      2 mg         24      4 mg           25      2 mg         26      4 mg         27      4 mg         28            29               30                 Date Details   11/14 This INR check       Date of next INR:  11/28/2018         How to take your warfarin dose     To take:  2 mg Take 1 of the 2 mg tablets.    To take:  4 mg Take 2 of the 2 mg tablets.

## 2018-11-28 ENCOUNTER — ANTICOAGULATION THERAPY VISIT (OUTPATIENT)
Dept: ANTICOAGULATION | Facility: OTHER | Age: 83
End: 2018-11-28
Attending: INTERNAL MEDICINE
Payer: MEDICARE

## 2018-11-28 LAB — INR POINT OF CARE: 2.1 (ref 0.86–1.14)

## 2018-11-28 PROCEDURE — 36416 COLLJ CAPILLARY BLOOD SPEC: CPT | Mod: ZL

## 2018-11-28 NOTE — MR AVS SNAPSHOT
Ambrose Ni   11/28/2018 8:45 AM   Anticoagulation Therapy Visit    Description:  89 year old male   Provider:  [facility]   Department:  [facility]           INR as of 11/28/2018     Today's INR 2.1      Anticoagulation Summary as of 11/28/2018     INR goal 2.0-3.0   Today's INR 2.1   Full warfarin instructions 2 mg on Sun, Wed, Fri; 4 mg all other days   Next INR check 1/9/2019    Indications   Unspecified atrial flutter [I48.92]  Long-term (current) use of anticoagulants [Z79.01] [Z79.01]         Description     Continue same dose of Warfarin/Coumadin and recheck INR in 6 weeks.   Elba Pereira RN    11/28/2018  8:55 AM        November 2018 Details    Sun Mon Tue Wed Thu Fri Sat         1               2               3                 4               5               6               7               8               9               10                 11               12               13               14               15               16               17                 18               19               20               21               22               23               24                 25               26               27               28      2 mg   See details      29      4 mg         30      2 mg           Date Details   11/28 This INR check               How to take your warfarin dose     To take:  2 mg Take 1 of the 2 mg tablets.    To take:  4 mg Take 2 of the 2 mg tablets.           December 2018 Details    Sun Mon Tue Wed Thu Fri Sat           1      4 mg           2      2 mg         3      4 mg         4      4 mg         5      2 mg         6      4 mg         7      2 mg         8      4 mg           9      2 mg         10      4 mg         11      4 mg         12      2 mg         13      4 mg         14      2 mg         15      4 mg           16      2 mg         17      4 mg         18      4 mg         19      2 mg         20      4 mg         21      2 mg         22      4 mg            23      2 mg         24      4 mg         25      4 mg         26      2 mg         27      4 mg         28      2 mg         29      4 mg           30      2 mg         31      4 mg               Date Details   No additional details            How to take your warfarin dose     To take:  2 mg Take 1 of the 2 mg tablets.    To take:  4 mg Take 2 of the 2 mg tablets.           January 2019 Details    Sun Mon Tue Wed Thu Fri Sat       1      4 mg         2      2 mg         3      4 mg         4      2 mg         5      4 mg           6      2 mg         7      4 mg         8      4 mg         9            10               11               12                 13               14               15               16               17               18               19                 20               21               22               23               24               25               26                 27               28               29               30               31                  Date Details   No additional details    Date of next INR:  1/9/2019         How to take your warfarin dose     To take:  2 mg Take 1 of the 2 mg tablets.    To take:  4 mg Take 2 of the 2 mg tablets.

## 2018-11-28 NOTE — PROGRESS NOTES
ANTICOAGULATION FOLLOW-UP CLINIC VISIT    Patient Name:  Ambrose Ni  Date:  11/28/2018  Contact Type:  Face to Face    SUBJECTIVE:     Patient Findings     Positives No Problem Findings           OBJECTIVE    INR Protime   Date Value Ref Range Status   11/28/2018 2.1 (A) 0.86 - 1.14 Final       ASSESSMENT / PLAN  INR assessment THER    Recheck INR In: 6 WEEKS    INR Location Clinic      Anticoagulation Summary as of 11/28/2018     INR goal 2.0-3.0   Today's INR 2.1   Warfarin maintenance plan 2 mg (2 mg x 1) on Sun, Wed, Fri; 4 mg (2 mg x 2) all other days   Full warfarin instructions 2 mg on Sun, Wed, Fri; 4 mg all other days   Weekly warfarin total 22 mg   No change documented Elba Pereira RN   Plan last modified Elba Pereira RN (2/23/2018)   Next INR check 1/9/2019   Priority INR   Target end date Indefinite    Indications   Unspecified atrial flutter [I48.92]  Long-term (current) use of anticoagulants [Z79.01] [Z79.01]         Anticoagulation Episode Summary     INR check location     Preferred lab     Send INR reminders to  INR    Comments       Anticoagulation Care Providers     Provider Role Specialty Phone number    Derick Brock MD Responsible Pediatrics [phone]            See the Encounter Report to view Anticoagulation Flowsheet and Dosing Calendar (Go to Encounters tab in chart review, and find the Anticoagulation Therapy Visit)        Elba Pereira RN

## 2019-01-09 ENCOUNTER — ANTICOAGULATION THERAPY VISIT (OUTPATIENT)
Dept: ANTICOAGULATION | Facility: OTHER | Age: 84
End: 2019-01-09
Attending: INTERNAL MEDICINE
Payer: MEDICARE

## 2019-01-09 LAB — INR POINT OF CARE: 2.7 (ref 2–3)

## 2019-01-09 PROCEDURE — 85610 PROTHROMBIN TIME: CPT | Mod: QW,ZL

## 2019-01-09 NOTE — PROGRESS NOTES
ANTICOAGULATION FOLLOW-UP CLINIC VISIT    Patient Name:  Ambrose Ni  Date:  2019  Contact Type:  Face to Face    SUBJECTIVE:     Patient Findings     Positives:   No Problem Findings           OBJECTIVE    INR Protime   Date Value Ref Range Status   2019 2.7 2.0 - 3.0 Final       ASSESSMENT / PLAN  INR assessment THER    Recheck INR In: 6 WEEKS    INR Location Clinic      Anticoagulation Summary  As of 2019    INR goal:   2.0-3.0   TTR:   71.6 % (6.3 y)   INR used for dosin.7 (2019)   Warfarin maintenance plan:   2 mg (2 mg x 1) every Sun, Wed, Fri; 4 mg (2 mg x 2) all other days   Full warfarin instructions:   2 mg every Sun, Wed, Fri; 4 mg all other days   Weekly warfarin total:   22 mg   No change documented:   Imelda Ratliff RN   Plan last modified:   Elba Pereira RN (2018)   Next INR check:   2019   Priority:   INR   Target end date:   Indefinite    Indications    Unspecified atrial flutter [I48.92]  Long-term (current) use of anticoagulants [Z79.01] [Z79.01]             Anticoagulation Episode Summary     INR check location:       Preferred lab:       Send INR reminders to:    INR    Comments:         Anticoagulation Care Providers     Provider Role Specialty Phone number    Derick Brock MD [facility] Pediatrics [phone]            See the Encounter Report to view Anticoagulation Flowsheet and Dosing Calendar (Go to Encounters tab in chart review, and find the Anticoagulation Therapy Visit)        Imelda Ratliff RN

## 2019-02-20 ENCOUNTER — ANTICOAGULATION THERAPY VISIT (OUTPATIENT)
Dept: ANTICOAGULATION | Facility: OTHER | Age: 84
End: 2019-02-20
Attending: INTERNAL MEDICINE
Payer: MEDICARE

## 2019-02-20 LAB — INR POINT OF CARE: 2.6 (ref 0.86–1.14)

## 2019-02-20 PROCEDURE — 85610 PROTHROMBIN TIME: CPT | Mod: QW,ZL

## 2019-02-20 NOTE — PROGRESS NOTES
ANTICOAGULATION FOLLOW-UP CLINIC VISIT    Patient Name:  Ambrose Ni  Date:  2019  Contact Type:  Face to Face    SUBJECTIVE:     Patient Findings     Positives:   No Problem Findings           OBJECTIVE    INR Protime   Date Value Ref Range Status   2019 2.6 (A) 0.86 - 1.14 Final       ASSESSMENT / PLAN  INR assessment THER    Recheck INR In: 6 WEEKS    INR Location Clinic      Anticoagulation Summary  As of 2019    INR goal:   2.0-3.0   TTR:   72.1 % (6.4 y)   INR used for dosin.6 (2019)   Warfarin maintenance plan:   2 mg (2 mg x 1) every Sun, Wed, Fri; 4 mg (2 mg x 2) all other days   Full warfarin instructions:   2 mg every Sun, Wed, Fri; 4 mg all other days   Weekly warfarin total:   22 mg   No change documented:   Elba Pereira RN   Plan last modified:   Elba Pereira RN (2018)   Next INR check:   4/3/2019   Priority:   INR   Target end date:   Indefinite    Indications    Unspecified atrial flutter [I48.92]  Long-term (current) use of anticoagulants [Z79.01] [Z79.01]             Anticoagulation Episode Summary     INR check location:       Preferred lab:       Send INR reminders to:    INR    Comments:         Anticoagulation Care Providers     Provider Role Specialty Phone number    Derick Brock MD [facility] Pediatrics [phone]            See the Encounter Report to view Anticoagulation Flowsheet and Dosing Calendar (Go to Encounters tab in chart review, and find the Anticoagulation Therapy Visit)        Elba Pereira RN

## 2019-04-03 ENCOUNTER — ANTICOAGULATION THERAPY VISIT (OUTPATIENT)
Dept: ANTICOAGULATION | Facility: OTHER | Age: 84
End: 2019-04-03
Attending: INTERNAL MEDICINE
Payer: MEDICARE

## 2019-04-03 DIAGNOSIS — I48.92 ATRIAL FLUTTER, CHRONIC (H): Primary | ICD-10-CM

## 2019-04-03 LAB — INR POINT OF CARE: 3.8 (ref 0.86–1.14)

## 2019-04-03 PROCEDURE — 85610 PROTHROMBIN TIME: CPT | Mod: QW

## 2019-04-03 NOTE — PROGRESS NOTES
ANTICOAGULATION FOLLOW-UP CLINIC VISIT    Patient Name:  Ambrose Ni  Date:  4/3/2019  Contact Type:  Face to Face    SUBJECTIVE:     Patient Findings     Comments:   Patient denies any identifiable changes that caused the supratherapeutic INR.              OBJECTIVE    INR Protime   Date Value Ref Range Status   04/03/2019 3.8 (A) 0.86 - 1.14 Final       ASSESSMENT / PLAN  INR assessment SUPRA    Recheck INR In: 3 WEEKS    INR Location Clinic      Anticoagulation Summary  As of 4/3/2019    INR goal:   2.0-3.0   TTR:   71.4 % (6.5 y)   INR used for dosing:   3.8! (4/3/2019)   Warfarin maintenance plan:   2 mg (2 mg x 1) every Sun, Wed, Fri; 4 mg (2 mg x 2) all other days   Full warfarin instructions:   4/3: 1 mg; Otherwise 2 mg every Sun, Wed, Fri; 4 mg all other days   Weekly warfarin total:   22 mg   Plan last modified:   Elba Pereira RN (2/23/2018)   Next INR check:   4/24/2019   Priority:   INR   Target end date:   Indefinite    Indications    Unspecified atrial flutter [I48.92]  Long-term (current) use of anticoagulants [Z79.01] [Z79.01]             Anticoagulation Episode Summary     INR check location:       Preferred lab:       Send INR reminders to:    INR    Comments:         Anticoagulation Care Providers     Provider Role Specialty Phone number    Derick Brock MD [facility] Pediatrics [phone]            See the Encounter Report to view Anticoagulation Flowsheet and Dosing Calendar (Go to Encounters tab in chart review, and find the Anticoagulation Therapy Visit)        Elba Pereira RN

## 2019-04-20 DIAGNOSIS — I48.92 ATRIAL FLUTTER, CHRONIC (H): ICD-10-CM

## 2019-04-22 RX ORDER — WARFARIN SODIUM 2 MG/1
TABLET ORAL
Qty: 144 TABLET | Refills: 0 | Status: SHIPPED | OUTPATIENT
Start: 2019-04-22 | End: 2019-07-19

## 2019-04-22 NOTE — TELEPHONE ENCOUNTER
"Requested Prescriptions   Pending Prescriptions Disp Refills     warfarin (COUMADIN) 2 MG tablet [Pharmacy Med Name: WARFARIN SOD 2MG TABLETS (PURPLE)] 144 tablet 0     Sig: TAKE 2MG FOR 3 DAYS THEN TAKE 4MG FOR 4 DAYS/WEEK       Vitamin K Antagonists Failed - 4/20/2019 12:27 PM        Failed - INR is within goal in the past 6 weeks     Confirm INR is within goal in the past 6 weeks.     Recent Labs   Lab Test 04/03/19   INR 3.8*                       Passed - Recent (12 mo) or future (30 days) visit within the authorizing provider's specialty     Patient had office visit in the last 12 months or has a visit in the next 30 days with authorizing provider or within the authorizing provider's specialty.  See \"Patient Info\" tab in inbasket, or \"Choose Columns\" in Meds & Orders section of the refill encounter.              Passed - Medication is active on med list        Passed - Patient is 18 years of age or older      Prescription approved per [facility] Refill Protocol.      "

## 2019-04-24 ENCOUNTER — ANTICOAGULATION THERAPY VISIT (OUTPATIENT)
Dept: ANTICOAGULATION | Facility: OTHER | Age: 84
End: 2019-04-24
Attending: INTERNAL MEDICINE
Payer: MEDICARE

## 2019-04-24 LAB — INR POINT OF CARE: 2.3 (ref 0.86–1.14)

## 2019-04-24 PROCEDURE — 85610 PROTHROMBIN TIME: CPT | Mod: QW,ZL

## 2019-04-24 NOTE — PROGRESS NOTES
ANTICOAGULATION FOLLOW-UP CLINIC VISIT    Patient Name:  Ambrose Ni  Date:  2019  Contact Type:  Face to Face    SUBJECTIVE:     Patient Findings     Comments:   The patient was assessed for diet, medication, and activity level changes, missed or extra doses, bruising or bleeding, with no problem findings.             OBJECTIVE    INR Protime   Date Value Ref Range Status   2019 2.3 (A) 0.86 - 1.14 Final       ASSESSMENT / PLAN  INR assessment THER    Recheck INR In: 6 WEEKS    INR Location Clinic      Anticoagulation Summary  As of 2019    INR goal:   2.0-3.0   TTR:   71.2 % (6.6 y)   INR used for dosin.3 (2019)   Warfarin maintenance plan:   2 mg (2 mg x 1) every Sun, Wed, Fri; 4 mg (2 mg x 2) all other days   Full warfarin instructions:   2 mg every Sun, Wed, Fri; 4 mg all other days   Weekly warfarin total:   22 mg   No change documented:   Elba Pereira RN   Plan last modified:   Elba Pereira RN (2018)   Next INR check:   2019   Priority:   INR   Target end date:   Indefinite    Indications    Unspecified atrial flutter [I48.92]  Long-term (current) use of anticoagulants [Z79.01] [Z79.01]             Anticoagulation Episode Summary     INR check location:       Preferred lab:       Send INR reminders to:    INR    Comments:         Anticoagulation Care Providers     Provider Role Specialty Phone number    Derick Brock MD [facility] [phone]            See the Encounter Report to view Anticoagulation Flowsheet and Dosing Calendar (Go to Encounters tab in chart review, and find the Anticoagulation Therapy Visit)        Elba Pereira RN

## 2019-06-05 ENCOUNTER — ANTICOAGULATION THERAPY VISIT (OUTPATIENT)
Dept: ANTICOAGULATION | Facility: OTHER | Age: 84
End: 2019-06-05
Attending: INTERNAL MEDICINE
Payer: MEDICARE

## 2019-06-05 LAB — INR POINT OF CARE: 2.3 (ref 0.86–1.14)

## 2019-06-05 PROCEDURE — 85610 PROTHROMBIN TIME: CPT | Mod: QW,ZL

## 2019-06-05 NOTE — PROGRESS NOTES
ANTICOAGULATION FOLLOW-UP CLINIC VISIT    Patient Name:  Ambrose Ni  Date:  2019  Contact Type:  Face to Face    SUBJECTIVE:  Patient Findings         Clinical Outcomes     Negatives:   Major bleeding event, Thromboembolic event, Anticoagulation-related hospital admission, Anticoagulation-related ED visit, Anticoagulation-related fatality           OBJECTIVE    INR Protime   Date Value Ref Range Status   2019 2.3 (A) 0.86 - 1.14 Final       ASSESSMENT / PLAN  INR assessment THER    Recheck INR In: 6 WEEKS    INR Location Clinic      Anticoagulation Summary  As of 2019    INR goal:   2.0-3.0   TTR:   71.7 % (6.7 y)   INR used for dosin.3 (2019)   Warfarin maintenance plan:   2 mg (2 mg x 1) every Sun, Wed, Fri; 4 mg (2 mg x 2) all other days   Full warfarin instructions:   2 mg every Sun, Wed, Fri; 4 mg all other days   Weekly warfarin total:   22 mg   No change documented:   Elba Pereira RN   Plan last modified:   Elba Pereira RN (2018)   Next INR check:   2019   Priority:   INR   Target end date:   Indefinite    Indications    Unspecified atrial flutter [I48.92]  Long-term (current) use of anticoagulants [Z79.01] [Z79.01]             Anticoagulation Episode Summary     INR check location:       Preferred lab:       Send INR reminders to:    INR    Comments:         Anticoagulation Care Providers     Provider Role Specialty Phone number    Derick Brock MD [facility] [phone]            See the Encounter Report to view Anticoagulation Flowsheet and Dosing Calendar (Go to Encounters tab in chart review, and find the Anticoagulation Therapy Visit)        Elba Pereira RN

## 2019-07-17 ENCOUNTER — ANTICOAGULATION THERAPY VISIT (OUTPATIENT)
Dept: ANTICOAGULATION | Facility: OTHER | Age: 84
End: 2019-07-17
Attending: INTERNAL MEDICINE
Payer: MEDICARE

## 2019-07-17 DIAGNOSIS — Z79.01 LONG TERM CURRENT USE OF ANTICOAGULANT THERAPY: ICD-10-CM

## 2019-07-17 LAB — INR POINT OF CARE: 1.9 (ref 0.86–1.14)

## 2019-07-17 PROCEDURE — 85610 PROTHROMBIN TIME: CPT | Mod: QW,ZL

## 2019-07-17 NOTE — PROGRESS NOTES
ANTICOAGULATION FOLLOW-UP CLINIC VISIT    Patient Name:  Ambrose Ni  Date:  2019  Contact Type:  Face to Face    SUBJECTIVE:  Patient Findings         Clinical Outcomes     Negatives:   Major bleeding event, Thromboembolic event, Anticoagulation-related hospital admission, Anticoagulation-related ED visit, Anticoagulation-related fatality           OBJECTIVE    INR Protime   Date Value Ref Range Status   2019 1.9 (A) 0.86 - 1.14 Final       ASSESSMENT / PLAN  INR assessment SUB    Recheck INR In: 3 WEEKS    INR Location Clinic      Anticoagulation Summary  As of 2019    INR goal:   2.0-3.0   TTR:   71.7 % (6.8 y)   INR used for dosin.9! (2019)   Warfarin maintenance plan:   2 mg (2 mg x 1) every Sun, Wed, Fri; 4 mg (2 mg x 2) all other days   Full warfarin instructions:   : 4 mg; Otherwise 2 mg every Sun, Wed, Fri; 4 mg all other days   Weekly warfarin total:   22 mg   Plan last modified:   Elba Pereira RN (2018)   Next INR check:   2019   Priority:   INR   Target end date:   Indefinite    Indications    Unspecified atrial flutter [I48.92]  Long-term (current) use of anticoagulants [Z79.01] [Z79.01]             Anticoagulation Episode Summary     INR check location:       Preferred lab:       Send INR reminders to:    INR    Comments:         Anticoagulation Care Providers     Provider Role Specialty Phone number    Derick Brock MD [facility] [phone]            See the Encounter Report to view Anticoagulation Flowsheet and Dosing Calendar (Go to Encounters tab in chart review, and find the Anticoagulation Therapy Visit)        Imelda Ratliff RN

## 2019-07-19 DIAGNOSIS — I48.92 ATRIAL FLUTTER, CHRONIC (H): ICD-10-CM

## 2019-07-19 RX ORDER — WARFARIN SODIUM 2 MG/1
TABLET ORAL
Qty: 144 TABLET | Refills: 1 | Status: SHIPPED | OUTPATIENT
Start: 2019-07-19 | End: 2019-08-07

## 2019-07-19 NOTE — TELEPHONE ENCOUNTER
"Requested Prescriptions   Pending Prescriptions Disp Refills     warfarin (COUMADIN) 2 MG tablet [Pharmacy Med Name: WARFARIN SOD 2MG TABLETS (PURPLE)] 144 tablet 0     Sig: TAKE 1 TABLET(2MG) BY MOUTH 3 DAYS PER WEEK AND 2 TABLETS(4MG) 4 DAYS PER WEEK.       Vitamin K Antagonists Failed - 7/19/2019 10:16 AM        Failed - INR is within goal in the past 6 weeks     Confirm INR is within goal in the past 6 weeks.     Recent Labs   Lab Test 07/17/19   INR 1.9*                       Passed - Recent (12 mo) or future (30 days) visit within the authorizing provider's specialty     Patient had office visit in the last 12 months or has a visit in the next 30 days with authorizing provider or within the authorizing provider's specialty.  See \"Patient Info\" tab in inbasket, or \"Choose Columns\" in Meds & Orders section of the refill encounter.              Passed - Medication is active on med list        Passed - Patient is 18 years of age or older        Prescription approved per [facility] Refill Protocol.    "

## 2019-07-25 DIAGNOSIS — I10 ESSENTIAL HYPERTENSION: ICD-10-CM

## 2019-07-26 RX ORDER — METOPROLOL SUCCINATE 100 MG/1
TABLET, EXTENDED RELEASE ORAL
Qty: 90 TABLET | Refills: 0 | Status: SHIPPED | OUTPATIENT
Start: 2019-07-26 | End: 2019-10-16

## 2019-07-26 NOTE — TELEPHONE ENCOUNTER
"Requested Prescriptions   Pending Prescriptions Disp Refills     metoprolol succinate ER (TOPROL-XL) 100 MG 24 hr tablet [Pharmacy Med Name: METOPROLOL ER SUCCINATE 100MG TABS] 90 tablet 0     Sig: TAKE 1 TABLET BY MOUTH EVERY DAY       Beta-Blockers Protocol Passed - 7/25/2019  2:50 PM        Passed - Blood pressure under 140/90 in past 12 months     BP Readings from Last 3 Encounters:   11/05/18 118/82   08/27/18 128/80   04/23/18 118/74                 Passed - Patient is age 6 or older        Passed - Recent (12 mo) or future (30 days) visit within the authorizing provider's specialty     Patient had office visit in the last 12 months or has a visit in the next 30 days with authorizing provider or within the authorizing provider's specialty.  See \"Patient Info\" tab in inbasket, or \"Choose Columns\" in Meds & Orders section of the refill encounter.              Passed - Medication is active on med list      LOV 11/5/2018 with cardiology 5/23/2019 with no changes in medication at either of these appointments.  Prescription refilled per RN Medication RefillPolicy.................... Swapna Sloan ....................  7/26/2019   12:03 PM        "

## 2019-07-27 DIAGNOSIS — G70.00 MYASTHENIA GRAVIS (H): ICD-10-CM

## 2019-07-31 RX ORDER — PYRIDOSTIGMINE BROMIDE 180 MG/1
TABLET, EXTENDED RELEASE ORAL
Qty: 90 TABLET | Refills: 0 | Status: SHIPPED | OUTPATIENT
Start: 2019-07-31 | End: 2019-10-26

## 2019-07-31 NOTE — TELEPHONE ENCOUNTER
Routing refill request to provider for review/approval because:  Drug not on the [facility] refill protocol     LOV: 11/5/18  Betsy Montalvo RN on 7/31/2019 at 11:44 AM

## 2019-08-07 ENCOUNTER — ANTICOAGULATION THERAPY VISIT (OUTPATIENT)
Dept: ANTICOAGULATION | Facility: OTHER | Age: 84
End: 2019-08-07
Attending: INTERNAL MEDICINE
Payer: MEDICARE

## 2019-08-07 DIAGNOSIS — I48.92 ATRIAL FLUTTER, CHRONIC (H): ICD-10-CM

## 2019-08-07 DIAGNOSIS — Z79.01 LONG TERM CURRENT USE OF ANTICOAGULANT THERAPY: ICD-10-CM

## 2019-08-07 LAB — INR POINT OF CARE: 1.8 (ref 0.86–1.14)

## 2019-08-07 PROCEDURE — 85610 PROTHROMBIN TIME: CPT | Mod: QW,ZL

## 2019-08-07 RX ORDER — WARFARIN SODIUM 2 MG/1
TABLET ORAL
Qty: 144 TABLET | Refills: 1
Start: 2019-08-07 | End: 2019-08-21

## 2019-08-07 NOTE — PROGRESS NOTES
ANTICOAGULATION FOLLOW-UP CLINIC VISIT    Patient Name:  Ambrose Ni  Date:  2019  Contact Type:  Face to Face    SUBJECTIVE:  Patient Findings     Comments:   Patient denies any identifiable changes that caused the subtherapeutic INR.           Clinical Outcomes     Negatives:   Major bleeding event, Thromboembolic event, Anticoagulation-related hospital admission, Anticoagulation-related ED visit, Anticoagulation-related fatality    Comments:   Patient denies any identifiable changes that caused the subtherapeutic INR.              OBJECTIVE    INR Protime   Date Value Ref Range Status   2019 1.8 (A) 0.86 - 1.14 Final       ASSESSMENT / PLAN  INR assessment SUB    Recheck INR In: 2 WEEKS    INR Location Clinic      Anticoagulation Summary  As of 2019    INR goal:   2.0-3.0   TTR:   71.1 % (6.9 y)   INR used for dosin.8! (2019)   Warfarin maintenance plan:   2 mg (2 mg x 1) every Sun, Fri; 4 mg (2 mg x 2) all other days   Full warfarin instructions:   2 mg every Sun, Fri; 4 mg all other days   Weekly warfarin total:   24 mg   Plan last modified:   Elba Pereira RN (2019)   Next INR check:   2019   Priority:   INR   Target end date:   Indefinite    Indications    Unspecified atrial flutter [I48.92]  Long-term (current) use of anticoagulants [Z79.01] [Z79.01]             Anticoagulation Episode Summary     INR check location:       Preferred lab:       Send INR reminders to:    INR    Comments:         Anticoagulation Care Providers     Provider Role Specialty Phone number    Derick Brock MD [facility] [phone]            See the Encounter Report to view Anticoagulation Flowsheet and Dosing Calendar (Go to Encounters tab in chart review, and find the Anticoagulation Therapy Visit)        Elba Pereira RN

## 2019-08-13 DIAGNOSIS — G70.00 MYASTHENIA GRAVIS (H): ICD-10-CM

## 2019-08-15 RX ORDER — PYRIDOSTIGMINE BROMIDE 60 MG/1
TABLET ORAL
Qty: 180 TABLET | Refills: 1 | Status: SHIPPED | OUTPATIENT
Start: 2019-08-15

## 2019-08-15 NOTE — TELEPHONE ENCOUNTER
Routing refill request to provider for review/approval because:  Drug not on the [facility] refill protocol     60mg tablet filled on 8-27-18 for #180 X 3 refills. LOV 11-5-18. Xochitl Senior RN on 8/15/2019 at 11:32 AM

## 2019-08-21 ENCOUNTER — ANTICOAGULATION THERAPY VISIT (OUTPATIENT)
Dept: ANTICOAGULATION | Facility: OTHER | Age: 84
End: 2019-08-21
Attending: INTERNAL MEDICINE
Payer: MEDICARE

## 2019-08-21 DIAGNOSIS — I48.92 ATRIAL FLUTTER, CHRONIC (H): ICD-10-CM

## 2019-08-21 DIAGNOSIS — Z79.01 LONG TERM CURRENT USE OF ANTICOAGULANT THERAPY: ICD-10-CM

## 2019-08-21 LAB — INR POINT OF CARE: 3 (ref 0.86–1.14)

## 2019-08-21 PROCEDURE — 85610 PROTHROMBIN TIME: CPT | Mod: QW,ZL

## 2019-08-21 RX ORDER — WARFARIN SODIUM 2 MG/1
TABLET ORAL
Qty: 144 TABLET | Refills: 1
Start: 2019-08-21 | End: 2019-12-23

## 2019-08-21 NOTE — PROGRESS NOTES
ANTICOAGULATION FOLLOW-UP CLINIC VISIT    Patient Name:  Ambrose Ni  Date:  8/21/2019  Contact Type:  Face to Face    SUBJECTIVE:  Patient Findings         Clinical Outcomes     Negatives:   Major bleeding event, Thromboembolic event, Anticoagulation-related hospital admission, Anticoagulation-related ED visit, Anticoagulation-related fatality           OBJECTIVE    INR Protime   Date Value Ref Range Status   08/21/2019 3.0 (A) 0.86 - 1.14 Final       ASSESSMENT / PLAN  INR assessment THER    Recheck INR In: 4 WEEKS    INR Location Clinic      Anticoagulation Summary  As of 8/21/2019    INR goal:   2.0-3.0   TTR:   71.2 % (6.9 y)   INR used for dosing:   3.0 (8/21/2019)   Warfarin maintenance plan:   2 mg (2 mg x 1) every Sun, Wed, Fri; 4 mg (2 mg x 2) all other days   Full warfarin instructions:   2 mg every Sun, Wed, Fri; 4 mg all other days   Weekly warfarin total:   22 mg   Plan last modified:   Elba Pereira RN (8/21/2019)   Next INR check:   9/18/2019   Priority:   INR   Target end date:   Indefinite    Indications    Unspecified atrial flutter [I48.92]  Long-term (current) use of anticoagulants [Z79.01] [Z79.01]             Anticoagulation Episode Summary     INR check location:       Preferred lab:       Send INR reminders to:    INR    Comments:         Anticoagulation Care Providers     Provider Role Specialty Phone number    Derick Brock MD [facility] [phone]            See the Encounter Report to view Anticoagulation Flowsheet and Dosing Calendar (Go to Encounters tab in chart review, and find the Anticoagulation Therapy Visit)        Elba Pereira RN

## 2019-09-18 ENCOUNTER — ANTICOAGULATION THERAPY VISIT (OUTPATIENT)
Dept: ANTICOAGULATION | Facility: OTHER | Age: 84
End: 2019-09-18
Attending: INTERNAL MEDICINE
Payer: MEDICARE

## 2019-09-18 DIAGNOSIS — Z79.01 LONG TERM CURRENT USE OF ANTICOAGULANT THERAPY: ICD-10-CM

## 2019-09-18 LAB — INR POINT OF CARE: 1.6 (ref 0.86–1.14)

## 2019-09-18 PROCEDURE — 85610 PROTHROMBIN TIME: CPT | Mod: QW,ZL

## 2019-09-18 NOTE — PROGRESS NOTES
ANTICOAGULATION FOLLOW-UP CLINIC VISIT    Patient Name:  Ambrose Ni  Date:  2019  Contact Type:  Face to Face    SUBJECTIVE:  Patient Findings     Positives:   Missed doses        Clinical Outcomes     Negatives:   Major bleeding event, Thromboembolic event, Anticoagulation-related hospital admission, Anticoagulation-related ED visit, Anticoagulation-related fatality           OBJECTIVE    INR Protime   Date Value Ref Range Status   2019 1.6 (A) 0.86 - 1.14 Final       ASSESSMENT / PLAN  INR assessment SUB    Recheck INR In: 2 WEEKS    INR Location Clinic      Anticoagulation Summary  As of 2019    INR goal:   2.0-3.0   TTR:   71.2 % (7 y)   INR used for dosin.6! (2019)   Warfarin maintenance plan:   2 mg (2 mg x 1) every Sun, Wed, Fri; 4 mg (2 mg x 2) all other days   Full warfarin instructions:   : 4 mg; Otherwise 2 mg every Sun, Wed, Fri; 4 mg all other days   Weekly warfarin total:   22 mg   Plan last modified:   Elba Pereira RN (2019)   Next INR check:   10/2/2019   Priority:   INR   Target end date:   Indefinite    Indications    Unspecified atrial flutter [I48.92]  Long-term (current) use of anticoagulants [Z79.01] [Z79.01]             Anticoagulation Episode Summary     INR check location:       Preferred lab:       Send INR reminders to:    INR    Comments:         Anticoagulation Care Providers     Provider Role Specialty Phone number    Derick Brock MD [facility] [phone]            See the Encounter Report to view Anticoagulation Flowsheet and Dosing Calendar (Go to Encounters tab in chart review, and find the Anticoagulation Therapy Visit)        Elba Pereira RN

## 2019-10-02 ENCOUNTER — ANTICOAGULATION THERAPY VISIT (OUTPATIENT)
Dept: ANTICOAGULATION | Facility: OTHER | Age: 84
End: 2019-10-02
Attending: INTERNAL MEDICINE
Payer: MEDICARE

## 2019-10-02 ENCOUNTER — ALLIED HEALTH/NURSE VISIT (OUTPATIENT)
Dept: FAMILY MEDICINE | Facility: OTHER | Age: 84
End: 2019-10-02
Attending: INTERNAL MEDICINE
Payer: MEDICARE

## 2019-10-02 DIAGNOSIS — Z23 NEED FOR PROPHYLACTIC VACCINATION AND INOCULATION AGAINST INFLUENZA: Primary | ICD-10-CM

## 2019-10-02 DIAGNOSIS — Z79.01 LONG TERM CURRENT USE OF ANTICOAGULANT THERAPY: ICD-10-CM

## 2019-10-02 LAB — INR POINT OF CARE: 2.2 (ref 0.86–1.14)

## 2019-10-02 PROCEDURE — G0008 ADMIN INFLUENZA VIRUS VAC: HCPCS

## 2019-10-02 PROCEDURE — 90662 IIV NO PRSV INCREASED AG IM: CPT

## 2019-10-02 PROCEDURE — 85610 PROTHROMBIN TIME: CPT | Mod: QW,ZL

## 2019-10-02 NOTE — PROGRESS NOTES
ANTICOAGULATION FOLLOW-UP CLINIC VISIT    Patient Name:  Ambrose Ni  Date:  10/2/2019  Contact Type:  Face to Face    SUBJECTIVE:  Patient Findings         Clinical Outcomes     Negatives:   Major bleeding event, Thromboembolic event, Anticoagulation-related hospital admission, Anticoagulation-related ED visit, Anticoagulation-related fatality           OBJECTIVE    INR Protime   Date Value Ref Range Status   10/02/2019 2.2 (A) 0.86 - 1.14 Final       ASSESSMENT / PLAN  INR assessment THER    Recheck INR In: 6 WEEKS    INR Location Clinic      Anticoagulation Summary  As of 10/2/2019    INR goal:   2.0-3.0   TTR:   71.0 % (7 y)   INR used for dosin.2 (10/2/2019)   Warfarin maintenance plan:   2 mg (2 mg x 1) every Sun, Wed, Fri; 4 mg (2 mg x 2) all other days   Full warfarin instructions:   2 mg every Sun, Wed, Fri; 4 mg all other days   Weekly warfarin total:   22 mg   No change documented:   Elba Pereira RN   Plan last modified:   Elba Pereira RN (2019)   Next INR check:   2019   Priority:   INR   Target end date:   Indefinite    Indications    Unspecified atrial flutter [I48.92]  Long-term (current) use of anticoagulants [Z79.01] [Z79.01]             Anticoagulation Episode Summary     INR check location:       Preferred lab:       Send INR reminders to:    INR    Comments:         Anticoagulation Care Providers     Provider Role Specialty Phone number    Derick Brock MD [facility] [phone]            See the Encounter Report to view Anticoagulation Flowsheet and Dosing Calendar (Go to Encounters tab in chart review, and find the Anticoagulation Therapy Visit)      He is moving to [facility] in the next month. Will have INR managed there.    Elba Pereira RN

## 2019-10-03 ENCOUNTER — ANTICOAGULATION THERAPY VISIT (OUTPATIENT)
Dept: ANTICOAGULATION | Facility: OTHER | Age: 84
End: 2019-10-03

## 2019-10-03 DIAGNOSIS — Z79.01 LONG TERM CURRENT USE OF ANTICOAGULANT THERAPY: ICD-10-CM

## 2019-10-16 DIAGNOSIS — I10 ESSENTIAL HYPERTENSION: ICD-10-CM

## 2019-10-16 RX ORDER — METOPROLOL SUCCINATE 100 MG/1
100 TABLET, EXTENDED RELEASE ORAL DAILY
Qty: 90 TABLET | Refills: 1 | Status: SHIPPED | OUTPATIENT
Start: 2019-10-16

## 2019-10-26 DIAGNOSIS — G70.00 MYASTHENIA GRAVIS (H): ICD-10-CM

## 2019-10-30 NOTE — TELEPHONE ENCOUNTER
Routing refill request to provider for review/approval because:  Drug not on the [facility] refill protocol     LOV; 11/5/18    Betsy Montalvo RN on 10/30/2019 at 12:22 PM

## 2019-10-31 RX ORDER — PYRIDOSTIGMINE BROMIDE 180 MG/1
TABLET, EXTENDED RELEASE ORAL
Qty: 90 TABLET | Refills: 0 | Status: SHIPPED | OUTPATIENT
Start: 2019-10-31

## 2019-10-31 NOTE — TELEPHONE ENCOUNTER
-- Schedule OV for med management    Signed, Derick Brock MD, FAAP, FACP  Internal Medicine & Pediatrics

## 2019-12-23 DIAGNOSIS — I48.92 ATRIAL FLUTTER, CHRONIC (H): ICD-10-CM

## 2019-12-23 RX ORDER — WARFARIN SODIUM 2 MG/1
TABLET ORAL
Qty: 60 TABLET | Refills: 0 | Status: SHIPPED | OUTPATIENT
Start: 2019-12-23

## 2019-12-23 NOTE — TELEPHONE ENCOUNTER
"Requested Prescriptions   Pending Prescriptions Disp Refills     warfarin ANTICOAGULANT (COUMADIN) 2 MG tablet [Pharmacy Med Name: WARFARIN SOD 2MG TABLETS (PURPLE)] 144 tablet 1     Sig: TAKE 1 TABLET(2MG) BY MOUTH 3 DAYS PER WEEK AND 2 TABLETS(4MG) 4 DAYS PER WEEK.       Vitamin K Antagonists Failed - 12/23/2019 11:40 AM        Failed - Recent (12 mo) or future (30 days) visit within the authorizing provider's specialty     Patient has had an office visit with the authorizing provider or a provider within the authorizing providers department within the previous 12 mos or has a future within next 30 days. See \"Patient Info\" tab in inbasket, or \"Choose Columns\" in Meds & Orders section of the refill encounter.              Failed - INR is within goal in the past 6 weeks     Confirm INR is within goal in the past 6 weeks.     Recent Labs   Lab Test 10/02/19   INR 2.2*                       Failed - Medication is active on med list        Passed - Patient is 18 years of age or older        Moved to [facility]. Limited supply given patient needs to establish care there.   Prescription approved per [facility] Refill Protocol.    "

## 2025-05-18 NOTE — TELEPHONE ENCOUNTER
This is a Refill request from: [facility]   Name of Medication and Dose:  Metoprolol 100mg  Quantity requested:  90 tablets  Last fill date: 9/17/17  Last Office Visit: 4/23/18  PCP:  Derick Brock MD  Controlled Substance Agreement: N/A  Associated Diagnosis: N/A    Neela Davey LPN ....................  5/31/2018   2:31 PM         No